# Patient Record
Sex: MALE | Race: WHITE | NOT HISPANIC OR LATINO | Employment: FULL TIME | URBAN - METROPOLITAN AREA
[De-identification: names, ages, dates, MRNs, and addresses within clinical notes are randomized per-mention and may not be internally consistent; named-entity substitution may affect disease eponyms.]

---

## 2017-01-05 ENCOUNTER — OFFICE VISIT (OUTPATIENT)
Dept: MEDICAL GROUP | Facility: CLINIC | Age: 57
End: 2017-01-05
Payer: COMMERCIAL

## 2017-01-05 VITALS
HEART RATE: 106 BPM | HEIGHT: 72 IN | TEMPERATURE: 97.5 F | SYSTOLIC BLOOD PRESSURE: 170 MMHG | WEIGHT: 220 LBS | BODY MASS INDEX: 29.8 KG/M2 | DIASTOLIC BLOOD PRESSURE: 110 MMHG | RESPIRATION RATE: 20 BRPM | OXYGEN SATURATION: 95 %

## 2017-01-05 DIAGNOSIS — I10 ESSENTIAL HYPERTENSION: ICD-10-CM

## 2017-01-05 DIAGNOSIS — Z12.11 SCREENING FOR MALIGNANT NEOPLASM OF COLON: ICD-10-CM

## 2017-01-05 DIAGNOSIS — J45.40 MODERATE PERSISTENT ASTHMA WITHOUT COMPLICATION: ICD-10-CM

## 2017-01-05 PROCEDURE — 99214 OFFICE O/P EST MOD 30 MIN: CPT | Performed by: PHYSICIAN ASSISTANT

## 2017-01-05 RX ORDER — ALBUTEROL SULFATE 90 UG/1
2 AEROSOL, METERED RESPIRATORY (INHALATION) EVERY 6 HOURS PRN
Qty: 8.5 G | Refills: 3 | Status: SHIPPED | OUTPATIENT
Start: 2017-01-05 | End: 2018-04-11 | Stop reason: SDUPTHER

## 2017-01-05 RX ORDER — FLUTICASONE PROPIONATE 220 UG/1
1 AEROSOL, METERED RESPIRATORY (INHALATION) 2 TIMES DAILY
Qty: 1 INHALER | Refills: 3 | Status: SHIPPED | OUTPATIENT
Start: 2017-01-05 | End: 2018-04-11 | Stop reason: SDUPTHER

## 2017-01-05 RX ORDER — LISINOPRIL 20 MG/1
20 TABLET ORAL DAILY
Qty: 30 TAB | Refills: 11 | Status: SHIPPED | OUTPATIENT
Start: 2017-01-05 | End: 2018-04-11 | Stop reason: SDUPTHER

## 2017-01-05 ASSESSMENT — PATIENT HEALTH QUESTIONNAIRE - PHQ9: CLINICAL INTERPRETATION OF PHQ2 SCORE: 0

## 2017-01-05 NOTE — MR AVS SNAPSHOT
"        Geoffrey Murrieta   2017 7:45 AM   Office Visit   MRN: [id number]    Department:  [facility]   Dept Phone:  [phone]    Description:  Male : 1960   Provider:  Padmini Corrigan PA-C           Reason for Visit     Wheezing patient states he is her for wheezing patient duenas asthma    Hypertension patient is concerned with Bp      Allergies as of 2017     No Known Allergies      You were diagnosed with     Essential hypertension   [8326888]       Moderate persistent asthma without complication   [197534]       Screening for malignant neoplasm of colon   [2173190]         Vital Signs     Blood Pressure Pulse Temperature Respirations Height Weight    170/110 mmHg 106 36.4 °C (97.5 °F) 20 1.829 m (6' 0.01\") 99.791 kg (220 lb)    Body Mass Index Oxygen Saturation Smoking Status             29.83 kg/m2 95% Former Smoker         Basic Information     Date Of Birth Sex Race Ethnicity Preferred Language    1960 Male White Non- English      Problem List              ICD-10-CM Priority Class Noted - Resolved    Essential hypertension I10   2017 - Present    Moderate persistent asthma J45.40   2017 - Present      Health Maintenance        Date Due Completion Dates    IMM DTaP/Tdap/Td Vaccine (1 - Tdap) 1979 ---    COLONOSCOPY 2010 ---    IMM INFLUENZA (1) 2016 ---            Current Immunizations     No immunizations on file.      Below and/or attached are the medications your provider expects you to take. Review all of your home medications and newly ordered medications with your provider and/or pharmacist. Follow medication instructions as directed by your provider and/or pharmacist. Please keep your medication list with you and share with your provider. Update the information when medications are discontinued, doses are changed, or new medications (including over-the-counter products) are added; and carry medication information at all times in the event of " emergency situations     Allergies:  No Known Allergies          Medications  Valid as of: January 05, 2017 -  8:40 AM    Generic Name Brand Name Tablet Size Instructions for use    Albuterol Sulfate (Aero Soln) albuterol 108 (90 BASE) MCG/ACT Inhale 2 Puffs by mouth every 6 hours as needed for Shortness of Breath.        Lisinopril (Tab) PRINIVIL 20 MG Take 1 Tab by mouth every day.        Omeprazole Magnesium (Tablet Delayed Response) PRILOSEC OTC 20 MG Take 20 mg by mouth every day.        .                 Medicines prescribed today were sent to:     [id number]  [place], [place] - [address] AT [facility] [address]    [address] [place] [address]    Phone: [phone] Fax: [phone]    Open 24 Hours?: No      Medication refill instructions:       If your prescription bottle indicates you have medication refills left, it is not necessary to call your provider’s office. Please contact your pharmacy and they will refill your medication.    If your prescription bottle indicates you do not have any refills left, you may request refills at any time through one of the following ways: The online Glipho system (except Urgent Care), by calling your provider’s office, or by asking your pharmacy to contact your provider’s office with a refill request. Medication refills are processed only during regular business hours and may not be available until the next business day. Your provider may request additional information or to have a follow-up visit with you prior to refilling your medication.   *Please Note: Medication refills are assigned a new Rx number when refilled electronically. Your pharmacy may indicate that no refills were authorized even though a new prescription for the same medication is available at the pharmacy. Please request the medicine by name with the pharmacy before contacting your provider for a refill.        Referral     A referral request has been sent to our patient  care coordination department. Please allow 3-5 business days for us to process this request and contact you either by phone or mail. If you do not hear from us by the 5th business day, please call us at [phone].           vSocial Access Code: Activation code not generated  Current vSocial Status: Active

## 2017-01-05 NOTE — PROGRESS NOTES
"Chief Complaint   Patient presents with   • Wheezing     patient states he is her for wheezing patient duenas asthma   • Hypertension     patient is concerned with Bp       HPI  Geoffrey Murrieta is a 56 y.o. male here today for high blood perssure. This is a new problem. pt has never been on BP meds. He was not previously diagnosed with hypertension. Checks BP at stores and office, averages 147/104, 164/102, 138/100. States BP was lower when he weighted less around 195 lb. His plan was to loose weight which hasn't happened yet and he is concerned until he loses weight he needs to control his blood pressure with medicine.  He denies HA, blurred vision, dizziness, shortness of breath, palpitations, or chest pain, lower extremity edema. Patient's wife is a nurse and can check his blood pressure.     Moderate persistent asthma  Patient has known asthma for many years. However he states recently ashtma has been worse since he has put on some extra wt. Currently wakes up every night with wheezing and asthma symptoms and uses albuterol every night, no issues during the day. Exercise also exacerbates asthma. Has SOB especially at night. No chest pain, no productive cough. No fevers or chills.          Past medical, surgical, family, and social history is reviewed in Epic chart by me today.   Medications and allergies reviewed and updated in Epic chart by me today.     ROS:   As documented in history of present illness above    Exam:  Blood pressure 170/110, pulse 106, temperature 36.4 °C (97.5 °F), resp. rate 20, height 1.829 m (6' 0.01\"), weight 99.791 kg (220 lb), SpO2 95 %.  Constitutional: Alert, no distress, plus 3 vital signs  Skin:  Warm, dry, no rashes invisible areas  Eye: Equal, round and reactive, conjunctiva clear  ENMT: Lips without lesions, good dentition, oropharynx clear    Neck: Trachea midline, no masses, no thyromegaly  Respiratory: Unlabored respiratory effort, lungs clear to auscultation, no wheezes, no " rhonchi  Cardiovascular: RRR, no murmur, no lower extremities edema, pedal pulses equal bilaterally and 2+/4   Abdomen: Soft, nontender, no masses or hepatosplenomegaly  Psych: Alert, pleasant, well-groomed, normal affect    A/P:  1. Essential hypertension  Start lisinopril  - lisinopril (PRINIVIL) 20 MG Tab; Take 1 Tab by mouth every day.  Dispense: 30 Tab; Refill: 11    2. Moderate persistent asthma without complication  Asthma is not controlled w albuterol. We'll add maintenance therapy  - fluticasone (FLOVENT HFA) 220 MCG/ACT Aerosol; Inhale 1 Puff by mouth 2 times a day.  Dispense: 1 Inhaler; Refill: 3  - albuterol 108 (90 BASE) MCG/ACT Aero Soln inhalation aerosol; Inhale 2 Puffs by mouth every 6 hours as needed for Shortness of Breath.  Dispense: 8.5 g; Refill: 3    3. Screening for malignant neoplasm of colon  - REFERRAL TO GI FOR COLONOSCOPY    Follow up in 8 weeks for asthma and hypertension

## 2017-01-05 NOTE — ASSESSMENT & PLAN NOTE
ashtma has been worse since he has put on wt, currently uses albuterol every night, no issue during the day.

## 2017-04-13 ENCOUNTER — TELEPHONE (OUTPATIENT)
Dept: MEDICAL GROUP | Facility: CLINIC | Age: 57
End: 2017-04-13

## 2017-04-13 DIAGNOSIS — I10 ESSENTIAL HYPERTENSION: ICD-10-CM

## 2017-04-13 RX ORDER — AMLODIPINE BESYLATE 5 MG/1
5 TABLET ORAL DAILY
Qty: 30 TAB | Refills: 11 | Status: SHIPPED | OUTPATIENT
Start: 2017-04-13 | End: 2017-06-23

## 2017-04-13 NOTE — TELEPHONE ENCOUNTER
Pt called today to state that he is having finger swelling and pain. He states that he believes it is due to his blood pressure medication he was given at his last visit. He was prescribed lisinopril 20mg.     Please advise.   Thank you.

## 2017-04-13 NOTE — TELEPHONE ENCOUNTER
Please inform pt that amlodipine was sent to his pharmacy. Please inform patient that amlodipine was sent to his pharmacy. However he still needs a follow-up appointment to see if his blood pressure medicine is working and his blood pressure is under control. He can have the follow-up appointment w another primary provider.     Padmini Corrigan PA-C

## 2017-04-13 NOTE — TELEPHONE ENCOUNTER
Pt is stating that it is an inconvenience for him to have an appointment with Dr Marshall because he lives on the other side of [facility]. He says he would like you to just call him in a new prescription to replace this on and does not want to come in again.      Please advise.  Thank you.

## 2017-04-14 NOTE — TELEPHONE ENCOUNTER
Called and spoke with patient and let him know about the new prescription sent to his pharmacy. Pt will  medication and track his blood pressure as well as scheduling an appointment with his PCP to follow up regarding the blood pressure problem. Pt is in full understanding.

## 2017-06-23 ENCOUNTER — HOSPITAL ENCOUNTER (OUTPATIENT)
Dept: LAB | Facility: MEDICAL CENTER | Age: 57
End: 2017-06-23
Attending: INTERNAL MEDICINE
Payer: COMMERCIAL

## 2017-06-23 ENCOUNTER — OFFICE VISIT (OUTPATIENT)
Dept: MEDICAL GROUP | Facility: PHYSICIAN GROUP | Age: 57
End: 2017-06-23
Payer: COMMERCIAL

## 2017-06-23 VITALS
DIASTOLIC BLOOD PRESSURE: 90 MMHG | WEIGHT: 215 LBS | BODY MASS INDEX: 29.12 KG/M2 | OXYGEN SATURATION: 95 % | SYSTOLIC BLOOD PRESSURE: 130 MMHG | TEMPERATURE: 98.1 F | HEIGHT: 72 IN | HEART RATE: 82 BPM | RESPIRATION RATE: 18 BRPM

## 2017-06-23 DIAGNOSIS — R63.8 INCREASED BMI: ICD-10-CM

## 2017-06-23 DIAGNOSIS — J45.40 MODERATE PERSISTENT ASTHMA WITHOUT COMPLICATION: ICD-10-CM

## 2017-06-23 DIAGNOSIS — Z78.9 ALCOHOL USE: ICD-10-CM

## 2017-06-23 DIAGNOSIS — M25.50 ARTHRALGIA, UNSPECIFIED JOINT: ICD-10-CM

## 2017-06-23 DIAGNOSIS — K21.9 GASTROESOPHAGEAL REFLUX DISEASE WITHOUT ESOPHAGITIS: ICD-10-CM

## 2017-06-23 DIAGNOSIS — E78.5 DYSLIPIDEMIA: ICD-10-CM

## 2017-06-23 LAB
CHOLEST SERPL-MCNC: 251 MG/DL (ref 100–199)
ERYTHROCYTE [SEDIMENTATION RATE] IN BLOOD BY WESTERGREN METHOD: 33 MM/HOUR (ref 0–20)
HDLC SERPL-MCNC: 42 MG/DL
LDLC SERPL CALC-MCNC: 187 MG/DL
TRIGL SERPL-MCNC: 108 MG/DL (ref 0–149)
URATE SERPL-MCNC: 8.7 MG/DL (ref 2.5–8.3)

## 2017-06-23 PROCEDURE — 80061 LIPID PANEL: CPT

## 2017-06-23 PROCEDURE — 85652 RBC SED RATE AUTOMATED: CPT

## 2017-06-23 PROCEDURE — 99214 OFFICE O/P EST MOD 30 MIN: CPT | Performed by: INTERNAL MEDICINE

## 2017-06-23 PROCEDURE — 36415 COLL VENOUS BLD VENIPUNCTURE: CPT

## 2017-06-23 PROCEDURE — 86038 ANTINUCLEAR ANTIBODIES: CPT

## 2017-06-23 PROCEDURE — 83695 ASSAY OF LIPOPROTEIN(A): CPT

## 2017-06-23 PROCEDURE — 84550 ASSAY OF BLOOD/URIC ACID: CPT

## 2017-06-23 PROCEDURE — 86200 CCP ANTIBODY: CPT

## 2017-06-23 RX ORDER — COLCHICINE 0.6 MG/1
TABLET ORAL
Qty: 3 TAB | Refills: 6 | Status: SHIPPED | OUTPATIENT
Start: 2017-06-23 | End: 2017-08-24 | Stop reason: SDUPTHER

## 2017-06-23 NOTE — MR AVS SNAPSHOT
"        Geoffrey Murrieta   2017 9:00 AM   Office Visit   MRN: [id number]    Department:  [facility]   Dept Phone:  [phone]    Description:  Male : 1960   Provider:  Matthew Marshall M.D.           Reason for Visit     Gout poss gout pain in joint,and welling      Allergies as of 2017     No Known Allergies      You were diagnosed with     Arthralgia, unspecified joint   [6866951]       Dyslipidemia   [836639]         Vital Signs     Blood Pressure Pulse Temperature Respirations Height Weight    130/90 mmHg 82 36.7 °C (98.1 °F) 18 1.829 m (6' 0.01\") 97.523 kg (215 lb)    Body Mass Index Oxygen Saturation Smoking Status             29.15 kg/m2 95% Former Smoker         Basic Information     Date Of Birth Sex Race Ethnicity Preferred Language    1960 Male White Non- English      Your appointments     2017  1:00 PM   Established Patient with Matthew Marshall M.D.   [facility] (--)    [address] [facility] [address]  [address]  [facility] [id number] [phone]           You will be receiving a confirmation call a few days before your appointment from our automated call confirmation system.              Problem List              ICD-10-CM Priority Class Noted - Resolved    Essential hypertension I10   2017 - Present    Moderate persistent asthma J45.40   2017 - Present      Health Maintenance        Date Due Completion Dates    IMM DTaP/Tdap/Td Vaccine (1 - Tdap) 2018 (Originally 1979) ---    IMM PNEUMOCOCCAL 19-64 (ADULT) MEDIUM RISK SERIES (1 of 1 - PPSV23) 2018 (Originally 1979) ---    COLONOSCOPY 2020            Current Immunizations     No immunizations on file.      Below and/or attached are the medications your provider expects you to take. Review all of your home medications and newly ordered medications with your provider and/or pharmacist. Follow medication instructions as directed by your provider and/or pharmacist. Please " keep your medication list with you and share with your provider. Update the information when medications are discontinued, doses are changed, or new medications (including over-the-counter products) are added; and carry medication information at all times in the event of emergency situations     Allergies:  No Known Allergies          Medications  Valid as of: June 23, 2017 -  9:33 AM    Generic Name Brand Name Tablet Size Instructions for use    Albuterol Sulfate (Aero Soln) albuterol 108 (90 BASE) MCG/ACT Inhale 2 Puffs by mouth every 6 hours as needed for Shortness of Breath.        Colchicine (Tab) COLCRYS 0.6 MG Take one tab every hour x 3        Fluticasone Propionate HFA (Aerosol) FLOVENT  MCG/ACT Inhale 1 Puff by mouth 2 times a day.        Lisinopril (Tab) PRINIVIL 20 MG Take 1 Tab by mouth every day.        Omeprazole Magnesium (Tablet Delayed Response) PRILOSEC OTC 20 MG Take 20 mg by mouth every day.        .                 Medicines prescribed today were sent to:     [facility] [address] [place] [address] [address] AT [facility] "[employer] [place] [address] [address] [place] [address]    Phone: [phone] Fax: [phone]    Open 24 Hours?: No      Medication refill instructions:       If your prescription bottle indicates you have medication refills left, it is not necessary to call your provider’s office. Please contact your pharmacy and they will refill your medication.    If your prescription bottle indicates you do not have any refills left, you may request refills at any time through one of the following ways: The online GetPromotd system (except Urgent Care), by calling your provider’s office, or by asking your pharmacy to contact your provider’s office with a refill request. Medication refills are processed only during regular business hours and may not be available until the next business day. Your provider may request additional information or to have a follow-up visit with you  prior to refilling your medication.   *Please Note: Medication refills are assigned a new Rx number when refilled electronically. Your pharmacy may indicate that no refills were authorized even though a new prescription for the same medication is available at the pharmacy. Please request the medicine by name with the pharmacy before contacting your provider for a refill.        Your To Do List     Future Labs/Procedures Complete By Expires    ANTI-NUCLEAR ANTIBODY SERUM  As directed 6/24/2018    CCP ANTIBODY  As directed 6/24/2018    LIPOPROTEIN A  As directed 6/23/2018    WESTERGREN SED RATE  As directed 6/24/2018         MyChart Access Code: Activation code not generated  Current MyChart Status: Active          Quit Tobacco Information     Do you want to quit using tobacco?    Quitting tobacco decreases risks of cancer, heart and lung disease, increases life expectancy, improves sense of taste and smell, and increases spending money, among other benefits.    If you are thinking about quitting, we can help.  • [facility] Quit Tobacco Program: 552-395-2309  o Program occurs weekly for four weeks and includes pharmacist consultation on products to support quitting smoking or chewing tobacco. A provider referral is needed for pharmacist consultation.  • Tobacco Users Help Hotline: 8-483-QUIT-NOW (746-2427) or https://[place].quitlogix.org/  o Free, confidential telephone and online coaching for [place] residents. Sessions are designed on a schedule that is convenient for you. Eligible clients receive free nicotine replacement therapy.  • Nationally: www.smokefree.gov  o Information and professional assistance to support both immediate and long-term needs as you become, and remain, a non-smoker. Smokefree.gov allows you to choose the help that best fits your needs.

## 2017-06-23 NOTE — PROGRESS NOTES
"Subjective:  Geoffrey is a 56 y.o. male with the following   Past Medical History   Diagnosis Date   • Hyperlipidemia    • GERD (gastroesophageal reflux disease)    • Allergy-induced asthma       Family History   Problem Relation Age of Onset   • Lung Disease Father      The patient is on the following medications:   Current outpatient prescriptions:   •  colchicine (COLCRYS) 0.6 MG Tab, Take one tab every hour x 3, Disp: 3 Tab, Rfl: 6  •  lisinopril (PRINIVIL) 20 MG Tab, Take 1 Tab by mouth every day., Disp: 30 Tab, Rfl: 11  •  fluticasone (FLOVENT HFA) 220 MCG/ACT Aerosol, Inhale 1 Puff by mouth 2 times a day., Disp: 1 Inhaler, Rfl: 3  •  albuterol 108 (90 BASE) MCG/ACT Aero Soln inhalation aerosol, Inhale 2 Puffs by mouth every 6 hours as needed for Shortness of Breath., Disp: 8.5 g, Rfl: 3  •  omeprazole (PRILOSEC OTC) 20 MG tablet, Take 20 mg by mouth every day., Disp: , Rfl:     HPI; patient is here today concerned about a recurrent localized joint swelling and pain usually after drinking alcohol alone or in combination with steak or seafood, he thinks that he has a gout, his brother has advanced gout with tophi . Patient is also concerned about the left elbow soft tissue swelling, doesn't recall injuries or fall, he continues with call consumption and states that he is never going to stop drinking. Per patient he has not taken amlodipine and lisinopril for more than a week as a potential cause of gout, his blood pressure not worsen, he has continued Flovent and albuterol for asthma denies having wheezing or shortness of breath, omeprazole for GERD denies having dysphagia..  ROS:  See HPI    Blood pressure 130/90, pulse 82, temperature 36.7 °C (98.1 °F), resp. rate 18, height 1.829 m (6' 0.01\"), weight 97.523 kg (215 lb), SpO2 95 %.on RA  Objective:  Patient is well appearing and in no acute distress.  Pharynx is clear.  Neck is soft and supple with no cervical or supraclavicular lymphadenopathy, thyromegaly or " masses, no JVD.  Lungs clear to auscultation bilaterally with normal respiratory effort. Abdomen soft, non-tender on palpation,not distended. Heart regular rate and rhythm without murmur. Extremities without any clubbing, cyanosis, or edema. Left elbow soft tissue swelling, nontender, no erythema.    Assessment and Plan:  1. Recurrent joint swelling and pain ; including ankles, fingers and toes. According to the patient usually worsen after drinking alcohol or in combination with high purine food.     2. Alcoholism; he doesn't think that he will ever quit drinking.      3. History of hypertension; per  patient he has stopped taking lisinopril and amlodipine a week ago, /90.       4. Increased BMI      5. Asthma; stable on Flovent and albuterol as needed.      6. GERD; it has responded to omeprazole 20 mg as needed.      Patient is advised on preventive and supportive care regarding arthralgia and gout and potential underlying etiologies, also discussed alcoholism, consequence of chronic excessive drinking, diet and lifestyle modification and weight loss. Colchicine 0.6 mg ×3, check labs and return for reevaluation in 2 weeks.     Please note that this dictation was created using voice recognition software. I have worked with consultants from the vendor as well as technical experts from [facility] [employer] to optimize the interface. I have made every reasonable attempt to correct obvious errors, but I expect that there are errors of grammar and possibly content that I did not discover before finalizing the note.

## 2017-06-25 LAB — LPA SERPL-MCNC: 5 MG/DL

## 2017-06-26 LAB
CCP IGG SERPL-ACNC: 7 UNITS (ref 0–19)
NUCLEAR IGG SER QL IA: DETECTED
NUCLEAR IGG TITR SER IF: ABNORMAL {TITER}

## 2017-07-12 ENCOUNTER — OFFICE VISIT (OUTPATIENT)
Dept: MEDICAL GROUP | Facility: PHYSICIAN GROUP | Age: 57
End: 2017-07-12
Payer: COMMERCIAL

## 2017-07-12 VITALS
OXYGEN SATURATION: 96 % | HEART RATE: 102 BPM | TEMPERATURE: 98.6 F | RESPIRATION RATE: 16 BRPM | WEIGHT: 226 LBS | DIASTOLIC BLOOD PRESSURE: 84 MMHG | HEIGHT: 72 IN | SYSTOLIC BLOOD PRESSURE: 142 MMHG | BODY MASS INDEX: 30.61 KG/M2

## 2017-07-12 DIAGNOSIS — K21.9 GASTROESOPHAGEAL REFLUX DISEASE WITHOUT ESOPHAGITIS: ICD-10-CM

## 2017-07-12 DIAGNOSIS — M1A.09X0 CHRONIC GOUT OF MULTIPLE SITES, UNSPECIFIED CAUSE: ICD-10-CM

## 2017-07-12 DIAGNOSIS — Z78.9 ALCOHOL USE: ICD-10-CM

## 2017-07-12 DIAGNOSIS — I10 ESSENTIAL HYPERTENSION: ICD-10-CM

## 2017-07-12 DIAGNOSIS — R63.8 INCREASED BMI: ICD-10-CM

## 2017-07-12 DIAGNOSIS — J45.40 MODERATE PERSISTENT ASTHMA WITHOUT COMPLICATION: ICD-10-CM

## 2017-07-12 PROCEDURE — 99214 OFFICE O/P EST MOD 30 MIN: CPT | Performed by: INTERNAL MEDICINE

## 2017-07-12 RX ORDER — ALLOPURINOL 100 MG/1
100 TABLET ORAL DAILY
Qty: 90 TAB | Refills: 3 | Status: SHIPPED | OUTPATIENT
Start: 2017-07-12 | End: 2018-04-11 | Stop reason: SDUPTHER

## 2017-07-12 NOTE — MR AVS SNAPSHOT
"        Geoffrey Murrieta   2017 1:00 PM   Office Visit   MRN: [id number]    Department:  [facility]   Dept Phone:  [phone]    Description:  Male : 1960   Provider:  Matthew Marshall M.D.           Reason for Visit     Gout     Results           Allergies as of 2017     No Known Allergies      You were diagnosed with     Chronic gout of multiple sites, unspecified cause   [0065454]       Essential hypertension   [5259704]         Vital Signs     Blood Pressure Pulse Temperature Respirations Height Weight    142/84 mmHg 102 37 °C (98.6 °F) 16 1.829 m (6' 0.01\") 102.513 kg (226 lb)    Body Mass Index Oxygen Saturation Smoking Status             30.64 kg/m2 96% Former Smoker         Basic Information     Date Of Birth Sex Race Ethnicity Preferred Language    1960 Male White Non- English      Your appointments     Nov 15, 2017  3:00 PM   Established Patient with Matthew Marshall M.D.   [facility] (--)    [address]  [address]  [facility] [id number] [phone]           You will be receiving a confirmation call a few days before your appointment from our automated call confirmation system.              Problem List              ICD-10-CM Priority Class Noted - Resolved    Essential hypertension I10   2017 - Present    Moderate persistent asthma J45.40   2017 - Present    Alcohol use Z78.9   2017 - Present    Increased BMI R63.8   2017 - Present    Gastroesophageal reflux disease without esophagitis K21.9   2017 - Present      Health Maintenance        Date Due Completion Dates    IMM INFLUENZA (1) 2018 (Originally 2017) ---    IMM DTaP/Tdap/Td Vaccine (1 - Tdap) 2018 (Originally 1979) ---    IMM PNEUMOCOCCAL 19-64 (ADULT) MEDIUM RISK SERIES (1 of 1 - PPSV23) 2018 (Originally 1979) ---    COLONOSCOPY 2020            Current Immunizations     No immunizations on file.      Below and/or attached are the " medications your provider expects you to take. Review all of your home medications and newly ordered medications with your provider and/or pharmacist. Follow medication instructions as directed by your provider and/or pharmacist. Please keep your medication list with you and share with your provider. Update the information when medications are discontinued, doses are changed, or new medications (including over-the-counter products) are added; and carry medication information at all times in the event of emergency situations     Allergies:  No Known Allergies          Medications  Valid as of: July 12, 2017 -  1:30 PM    Generic Name Brand Name Tablet Size Instructions for use    Albuterol Sulfate (Aero Soln) albuterol 108 (90 BASE) MCG/ACT Inhale 2 Puffs by mouth every 6 hours as needed for Shortness of Breath.        Allopurinol (Tab) ZYLOPRIM 100 MG Take 1 Tab by mouth every day.        Colchicine (Tab) COLCRYS 0.6 MG Take one tab every hour x 3        Fluticasone Propionate HFA (Aerosol) FLOVENT  MCG/ACT Inhale 1 Puff by mouth 2 times a day.        Lisinopril (Tab) PRINIVIL 20 MG Take 1 Tab by mouth every day.        Omeprazole Magnesium (Tablet Delayed Response) PRILOSEC OTC 20 MG Take 20 mg by mouth every day.        .                 Medicines prescribed today were sent to:     [facility] [address] [place] [address] [address] [facility] [address] [address] [place] [address]    Phone: [phone] Fax: [phone]    Open 24 Hours?: No      Medication refill instructions:       If your prescription bottle indicates you have medication refills left, it is not necessary to call your provider’s office. Please contact your pharmacy and they will refill your medication.    If your prescription bottle indicates you do not have any refills left, you may request refills at any time through one of the following ways: The online [employer] system (except Urgent Care), by calling your  provider’s office, or by asking your pharmacy to contact your provider’s office with a refill request. Medication refills are processed only during regular business hours and may not be available until the next business day. Your provider may request additional information or to have a follow-up visit with you prior to refilling your medication.   *Please Note: Medication refills are assigned a new Rx number when refilled electronically. Your pharmacy may indicate that no refills were authorized even though a new prescription for the same medication is available at the pharmacy. Please request the medicine by name with the pharmacy before contacting your provider for a refill.        Your To Do List     Future Labs/Procedures Complete By Expires    CREATININE  As directed 7/12/2018    WESTERGREN SED RATE  As directed 7/13/2018         MyChart Access Code: Activation code not generated  Current [employer] Status: Active          Quit Tobacco Information     Do you want to quit using tobacco?    Quitting tobacco decreases risks of cancer, heart and lung disease, increases life expectancy, improves sense of taste and smell, and increases spending money, among other benefits.    If you are thinking about quitting, we can help.  • [employer] Quit Tobacco Program: 732.130.6779  o Program occurs weekly for four weeks and includes pharmacist consultation on products to support quitting smoking or chewing tobacco. A provider referral is needed for pharmacist consultation.  • Tobacco Users Help Hotline: 8-218-QUIT-NOW (262-1256) or https://[place].quitlogix.org/  o Free, confidential telephone and online coaching for [place] residents. Sessions are designed on a schedule that is convenient for you. Eligible clients receive free nicotine replacement therapy.  • Nationally: www.smokefree.gov  o Information and professional assistance to support both immediate and long-term needs as you become, and remain, a non-smoker. Smokefree.gov  allows you to choose the help that best fits your needs.

## 2017-07-12 NOTE — PROGRESS NOTES
"Subjective:  Geoffrey is a 56 y.o. male with the following   Past Medical History   Diagnosis Date   • Hyperlipidemia    • GERD (gastroesophageal reflux disease)    • Allergy-induced asthma       Family History   Problem Relation Age of Onset   • Lung Disease Father      The patient is on the following medications:   Current outpatient prescriptions:   •  colchicine (COLCRYS) 0.6 MG Tab, Take one tab every hour x 3, Disp: 3 Tab, Rfl: 6  •  lisinopril (PRINIVIL) 20 MG Tab, Take 1 Tab by mouth every day., Disp: 30 Tab, Rfl: 11  •  fluticasone (FLOVENT HFA) 220 MCG/ACT Aerosol, Inhale 1 Puff by mouth 2 times a day., Disp: 1 Inhaler, Rfl: 3  •  albuterol 108 (90 BASE) MCG/ACT Aero Soln inhalation aerosol, Inhale 2 Puffs by mouth every 6 hours as needed for Shortness of Breath., Disp: 8.5 g, Rfl: 3  •  omeprazole (PRILOSEC OTC) 20 MG tablet, Take 20 mg by mouth every day., Disp: , Rfl:     HPI; Patient is here today for follow-up visit regarding his recent labs, per patient cultures and has made a significant improvement in his joint pain and swelling, also has cut back on drinking, requesting prophylactic therapy with allopurinol. Currently on omeprazole for GERD denies having dysphagia, on Flovent and albuterol for asthma denies having wheezing or shortness of breath..   ROS:  See HPI    Blood pressure 142/84, pulse 102, temperature 37 °C (98.6 °F), resp. rate 16, height 1.829 m (6' 0.01\"), weight 102.513 kg (226 lb), SpO2 96 %.on RA  Objective:  Patient is well appearing and in no acute distress.  Pharynx is clear.  Neck is soft and supple with no cervical or supraclavicular lymphadenopathy, thyromegaly or masses, no JVD.  Lungs clear to auscultation bilaterally with normal respiratory effort. Abdomen soft, non-tender on palpation,not distended. Heart regular rate and rhythm without murmur. Extremities without any clubbing, cyanosis, or edema.    Assessment and Plan:  1. Recurrent joint swelling and pain ; including " ankles, fingers and toes. According to the patient usually worsen after drinking alcohol or in combination with high purine food.Patient had immediate response to colchicine therapy, requesting prophylactic therapy with allopurinol.     Ref. Range 6/23/2017 09:42   Uric Acid Latest Ref Range: 2.5-8.3 mg/dL 8.7 (H)      Ref. Range 6/23/2017 09:42   Sed Rate Westergren Latest Ref Range: 0-20 mm/hour 33 (H)      Ref. Range 6/23/2017 09:42   Ccp Antibodies Latest Ref Range: 0-19 Units 7   Antinuclear Antibody Latest Ref Range: None Detected  Detected (A)   TORIBIO Titer Latest Ref Range: <1:40  1:40 (H)     2. Alcoholism; per patient he has cut back on drinking but did not quit drinking.      3. Borderline hypertension; per  patient he has stopped taking lisinopril and amlodipine.       4. Increased BMI      5. Asthma; stable on Flovent and albuterol as needed.      6. GERD; it has responded to omeprazole 20 mg as needed.    7. Dyslipidemia   Ref. Range 8/11/2011 14:16 9/18/2015 09:26 6/23/2017 09:42   Cholesterol,Tot Latest Ref Range: 100-199 mg/dL 325 (H) 252 (H) 251 (H)   Triglycerides Latest Ref Range: 0-149 mg/dL 146 94 108   HDL Latest Ref Range: >=40 mg/dL 53 48 42   LDL Latest Ref Range: <100 mg/dL 243 185 (H) 187 (H)   Lipoprotein A Latest Ref Range: <=29 mg/dL   5       Patient is advised on preventive and supportive care, diet and lifestyle modification, daily walking ,exercise and weight loss, cutting back on alcohol consumption. Start allopurinol 100 mg daily and modify doses as needed, monitor blood pressure if it is elevated start lisinopril as instructed, monitor labs.    Please note that this dictation was created using voice recognition software. I have worked with consultants from the vendor as well as technical experts from [employer] to optimize the interface. I have made every reasonable attempt to correct obvious errors, but I expect that there are errors of grammar and possibly content that I did  not discover before finalizing the note.

## 2017-08-25 RX ORDER — COLCHICINE 0.6 MG/1
TABLET ORAL
Qty: 3 TAB | Refills: 6 | Status: SHIPPED | OUTPATIENT
Start: 2017-08-25 | End: 2020-05-26

## 2017-11-15 ENCOUNTER — APPOINTMENT (OUTPATIENT)
Dept: MEDICAL GROUP | Facility: PHYSICIAN GROUP | Age: 57
End: 2017-11-15
Payer: COMMERCIAL

## 2018-04-11 DIAGNOSIS — J45.40 MODERATE PERSISTENT ASTHMA WITHOUT COMPLICATION: ICD-10-CM

## 2018-04-11 DIAGNOSIS — I10 ESSENTIAL HYPERTENSION: ICD-10-CM

## 2018-04-11 RX ORDER — ALBUTEROL SULFATE 90 UG/1
2 AEROSOL, METERED RESPIRATORY (INHALATION) EVERY 6 HOURS PRN
Qty: 8.5 G | Refills: 3 | Status: SHIPPED | OUTPATIENT
Start: 2018-04-11 | End: 2018-04-11 | Stop reason: SDUPTHER

## 2018-04-11 RX ORDER — ALLOPURINOL 100 MG/1
100 TABLET ORAL DAILY
Qty: 90 TAB | Refills: 1 | Status: SHIPPED | OUTPATIENT
Start: 2018-04-11 | End: 2019-04-29 | Stop reason: SDUPTHER

## 2018-04-11 RX ORDER — FLUTICASONE PROPIONATE 220 UG/1
1 AEROSOL, METERED RESPIRATORY (INHALATION) 2 TIMES DAILY
Qty: 1 INHALER | Refills: 3 | Status: SHIPPED | OUTPATIENT
Start: 2018-04-11 | End: 2018-05-25 | Stop reason: SDUPTHER

## 2018-04-11 RX ORDER — ALBUTEROL SULFATE 90 UG/1
2 AEROSOL, METERED RESPIRATORY (INHALATION) EVERY 6 HOURS PRN
Qty: 8.5 G | Refills: 3 | Status: SHIPPED | OUTPATIENT
Start: 2018-04-11 | End: 2018-04-12

## 2018-04-11 RX ORDER — LISINOPRIL 20 MG/1
20 TABLET ORAL DAILY
Qty: 30 TAB | Refills: 3 | Status: SHIPPED | OUTPATIENT
Start: 2018-04-11 | End: 2018-05-25 | Stop reason: SDUPTHER

## 2018-04-11 NOTE — TELEPHONE ENCOUNTER
Was the patient seen in the last year in this department? Yes     Does patient have an active prescription for medications requested? Yes     Received Request Via: Patient       Patient has New to You visit scheduled with Марина Ann on 7/12/18.

## 2018-04-12 ENCOUNTER — TELEPHONE (OUTPATIENT)
Dept: MEDICAL GROUP | Facility: PHYSICIAN GROUP | Age: 58
End: 2018-04-12

## 2018-04-12 DIAGNOSIS — J45.40 MODERATE PERSISTENT ASTHMA WITHOUT COMPLICATION: ICD-10-CM

## 2018-04-12 RX ORDER — ALBUTEROL SULFATE 90 UG/1
2 AEROSOL, METERED RESPIRATORY (INHALATION) EVERY 6 HOURS PRN
Qty: 8.5 G | Refills: 3 | Status: SHIPPED | OUTPATIENT
Start: 2018-04-12 | End: 2018-05-25 | Stop reason: SDUPTHER

## 2018-04-12 NOTE — TELEPHONE ENCOUNTER
Received a pharmacy fax stating Proventil HFA is not covered.  The preferred alternative is ProAir.  Please advise.

## 2018-05-25 DIAGNOSIS — J45.40 MODERATE PERSISTENT ASTHMA WITHOUT COMPLICATION: ICD-10-CM

## 2018-05-25 DIAGNOSIS — I10 ESSENTIAL HYPERTENSION: ICD-10-CM

## 2018-05-25 RX ORDER — FLUTICASONE PROPIONATE 220 UG/1
1 AEROSOL, METERED RESPIRATORY (INHALATION) 2 TIMES DAILY
Qty: 1 INHALER | Refills: 0 | Status: SHIPPED | OUTPATIENT
Start: 2018-05-25

## 2018-05-25 RX ORDER — LISINOPRIL 20 MG/1
20 TABLET ORAL DAILY
Qty: 90 TAB | Refills: 0 | Status: SHIPPED | OUTPATIENT
Start: 2018-05-25 | End: 2018-07-12

## 2018-05-25 RX ORDER — ALBUTEROL SULFATE 90 UG/1
2 AEROSOL, METERED RESPIRATORY (INHALATION) EVERY 6 HOURS PRN
Qty: 8.5 G | Refills: 0 | Status: SHIPPED | OUTPATIENT
Start: 2018-05-25 | End: 2020-03-19 | Stop reason: SDUPTHER

## 2018-05-25 NOTE — TELEPHONE ENCOUNTER
Was the patient seen in the last year in this department? Yes     Does patient have an active prescription for medications requested? No     Received Request Via: Patient     Pt has appt to establish with Brandi in July, would like [facility] use [facility] these Rx.

## 2018-07-12 ENCOUNTER — OFFICE VISIT (OUTPATIENT)
Dept: MEDICAL GROUP | Facility: PHYSICIAN GROUP | Age: 58
End: 2018-07-12
Payer: COMMERCIAL

## 2018-07-12 VITALS
SYSTOLIC BLOOD PRESSURE: 126 MMHG | RESPIRATION RATE: 16 BRPM | HEART RATE: 100 BPM | WEIGHT: 210.4 LBS | DIASTOLIC BLOOD PRESSURE: 80 MMHG | BODY MASS INDEX: 28.5 KG/M2 | HEIGHT: 72 IN | TEMPERATURE: 98.7 F | OXYGEN SATURATION: 92 %

## 2018-07-12 DIAGNOSIS — J45.40 MODERATE PERSISTENT ASTHMA WITHOUT COMPLICATION: ICD-10-CM

## 2018-07-12 DIAGNOSIS — K21.9 GASTROESOPHAGEAL REFLUX DISEASE WITHOUT ESOPHAGITIS: ICD-10-CM

## 2018-07-12 DIAGNOSIS — Z00.00 WELLNESS EXAMINATION: ICD-10-CM

## 2018-07-12 DIAGNOSIS — I10 ESSENTIAL HYPERTENSION: ICD-10-CM

## 2018-07-12 DIAGNOSIS — E78.2 MIXED HYPERLIPIDEMIA: ICD-10-CM

## 2018-07-12 DIAGNOSIS — Z87.39 HISTORY OF GOUT: ICD-10-CM

## 2018-07-12 PROBLEM — E78.5 HYPERLIPIDEMIA: Status: ACTIVE | Noted: 2018-07-12

## 2018-07-12 PROCEDURE — 99214 OFFICE O/P EST MOD 30 MIN: CPT | Performed by: PHYSICIAN ASSISTANT

## 2018-07-12 RX ORDER — AMLODIPINE BESYLATE 10 MG/1
10 TABLET ORAL DAILY
Qty: 90 TAB | Refills: 1 | Status: SHIPPED | OUTPATIENT
Start: 2018-07-12 | End: 2019-11-22

## 2018-07-12 RX ORDER — AMLODIPINE BESYLATE 5 MG/1
5 TABLET ORAL DAILY
COMMUNITY
End: 2018-07-12

## 2018-07-12 ASSESSMENT — PATIENT HEALTH QUESTIONNAIRE - PHQ9: CLINICAL INTERPRETATION OF PHQ2 SCORE: 0

## 2018-07-12 ASSESSMENT — PAIN SCALES - GENERAL: PAINLEVEL: NO PAIN

## 2018-07-12 NOTE — ASSESSMENT & PLAN NOTE
Chronic but stable condition. Patient's blood pressure during today's appointment 126/80 mmHg. Patient tells me he is currently prescribed amlodipine 5 mg tab once daily. Since he ran out of medication one month ago so started taking an old prescription of lisinopril 20 mg tab once daily. He tells me at home readings have been 138/93 mmHg and 139/83 mmHg. States he is unable to tolerate lisinopril due to muscle aches. Patient is requesting a refill for amlodipine during today's appointment. States diet is healthy but denies having a regular exercise routine.

## 2018-07-15 PROBLEM — Z87.39 HISTORY OF GOUT: Status: ACTIVE | Noted: 2018-07-15

## 2018-07-16 NOTE — ASSESSMENT & PLAN NOTE
Chronic stable problem. Patient is currently taking allopurinol 100 mg Once daily. Compliant with medication expenses no side effects or competitions her medication. Admits since starting prophylactic medication he has not had a flare up. Admits to alcohol use. 2 twelve packs per week.

## 2018-07-16 NOTE — PROGRESS NOTES
Chief Complaint   Patient presents with   • Establish Care   • Medication Refill       HISTORY OF PRESENT ILLNESS: Geoffrey Murrieta is an established 57 y.o. male here to discuss the evaluation and management of:    Essential hypertension  Chronic but stable condition. Patient's blood pressure during today's appointment 126/80 mmHg. Patient tells me he is currently prescribed amlodipine 5 mg tab once daily. Since he ran out of medication one month ago so started taking an old prescription of lisinopril 20 mg tab once daily. He tells me at home readings have been 138/93 mmHg and 139/83 mmHg. States he is unable to tolerate lisinopril due to muscle aches. Patient is requesting a refill for amlodipine during today's appointment. States diet is healthy but denies having a regular exercise routine.      Gastroesophageal reflux disease without esophagitis  Chronic but stable condition. Currently taking omeprazole 20 mg Once daily. States he is compliant with medication and experiences no side effects or complications from medication. Denies hoarseness of voice, nausea, vomiting, difficulty swallowing, abdominal pain.    Hyperlipidemia  Chronic problem. Patient is not on a statin medication. States he manages cholesterol levels by having a healthy diet. Patient is due for lab work.    Moderate persistent asthma  Chronic but stable condition. Patient is prescribed albuterol inhaler 2 puffs every 6 hours as needed. States he is compliant with medication and experiences no side effects or competitions her medication. Does not need a refill this time. Denies hospitalization for an acute asthmatic attack.    History of gout  Chronic stable problem. Patient is currently taking allopurinol 100 mg Once daily. Compliant with medication expenses no side effects or competitions her medication. Admits since starting prophylactic medication he has not had a flare up. Admits to alcohol use. 2 twelve packs per week.      Patient Active  Problem List    Diagnosis Date Noted   • History of gout 07/15/2018   • Hyperlipidemia 2018   • Alcohol use 2017   • Increased BMI 2017   • Gastroesophageal reflux disease without esophagitis 2017   • Essential hypertension 2017   • Moderate persistent asthma 2017       Allergies:Patient has no known allergies.    Current Outpatient Prescriptions   Medication Sig Dispense Refill   • amLODIPine (NORVASC) 10 MG Tab Take 1 Tab by mouth every day. 90 Tab 1   • albuterol 108 (90 Base) MCG/ACT Aero Soln inhalation aerosol Inhale 2 Puffs by mouth every 6 hours as needed for Shortness of Breath. 8.5 g 0   • allopurinol (ZYLOPRIM) 100 MG Tab Take 1 Tab by mouth every day. 90 Tab 1   • colchicine (COLCRYS) 0.6 MG Tab Take one tab every hour x 3 3 Tab 6   • omeprazole (PRILOSEC OTC) 20 MG tablet Take 20 mg by mouth every day.     • fluticasone (FLOVENT HFA) 220 MCG/ACT Aerosol Inhale 1 Puff by mouth 2 times a day. 1 Inhaler 0     No current facility-administered medications for this visit.        Social History   Substance Use Topics   • Smoking status: Former Smoker     Types: Cigarettes   • Smokeless tobacco: Current User     Types: Chew      Comment: 3-4 packs of cigarettes over his life span. Chews every other day.   • Alcohol use 6.0 oz/week     12 Cans of beer per week      Comment: Drinks 2 twelve packs per week.        Family Status   Relation Status   • Mother Alive   • Father    • Sister Alive   • Brother Alive   • Maternal Grandmother    • Maternal Grandfather    • Paternal Grandmother    • Paternal Grandfather    • Brother Alive   • Son Alive   • Daughter Alive   • Daughter Alive     Family History   Problem Relation Age of Onset   • No Known Problems Mother    • Lung Disease Father      COPD (smoker)   • Hyperlipidemia Father    • Other Brother      Gout   • Hyperlipidemia Brother    • Hypertension Brother    • Cancer Paternal Grandmother   "  • No Known Problems Son    • No Known Problems Daughter    • No Known Problems Daughter        ROS:  Review of Systems   Constitutional: Negative for fever, chills, weight loss and malaise/fatigue.   HENT: Negative for ear pain, nosebleeds, congestion, sore throat and neck pain.    Eyes: Negative for blurred vision.   Respiratory: Negative for cough, sputum production, shortness of breath and wheezing.    Cardiovascular: Negative for chest pain, palpitations, orthopnea and leg swelling.   Gastrointestinal: Negative for heartburn, nausea, vomiting and abdominal pain.   Genitourinary: Negative for dysuria, urgency and frequency.   Musculoskeletal: Negative for myalgias, back pain and joint pain.   Skin: Negative for rash and itching.   Neurological: Negative for dizziness, tingling, tremors, sensory change, focal weakness and headaches.   Endo/Heme/Allergies: Does not bruise/bleed easily.   Psychiatric/Behavioral: Negative for depression, suicidal ideas and memory loss.  The patient is not nervous/anxious and does not have insomnia.    All other systems reviewed and are negative except as in HPI.    Exam: Blood pressure 126/80, pulse 100, temperature 37.1 °C (98.7 °F), resp. rate 16, height 1.829 m (6' 0.01\"), weight 95.4 kg (210 lb 6.4 oz), SpO2 92 %. Body mass index is 28.53 kg/m².  General: Normal appearing. No distress.  HEENT: Normocephalic. Eyes conjunctiva clear lids without ptosis, pupils equal and reactive to light accommodation, ears normal shape and contour, canals are clear bilaterally, tympanic membranes are benign, nasal mucosa benign, oropharynx is without erythema, edema or exudates.   Neck: Supple without JVD or bruit. Thyroid is not enlarged.  Pulmonary: Clear to ausculation.  Normal effort. No rales, ronchi, or wheezing.  Cardiovascular: Regular rate and rhythm without murmur. Carotid and radial pulses are intact and equal bilaterally.  Abdomen: Soft, nontender, nondistended. Normal bowel sounds. " Liver and spleen are not palpable  Neurologic: Grossly nonfocal.  Cranial nerves are normal. LE DTR's normal and symmetric.  Lymph: No cervical, supraclavicular or axillary lymph nodes are palpable  Skin: Warm and dry.  No rashes or suspicious skin lesions.  Musculoskeletal: Normal gait. No extremity cyanosis, clubbing, or edema.  Psych: Normal mood and affect. Alert and oriented x3. Judgment and insight is normal.    Medical decision-making and discussion:  1. Gastroesophageal reflux disease without esophagitis  This is a chronic and stable problem. Patient is doing well. No red flags. Continue PPI and monitor.      2. Moderate persistent asthma without complication  Chronic but stable condition. Use albuterol inhaler as prescribed. Advised patient to use inhaler 30 minutes prior to exercise. Advised patient if he develops shortness of breath that is not alleviated with medication to seek immediate emergency care.    3. Essential hypertension  During today's appointment advised patient to discontinue lisinopril 20 mg tablets daily. Patient has been prescribed amlodipine 10 mg Once daily.    Advised patient to journal daily blood pressure readings and to bring to f/u appointment for further evaluation. Discussed with patient if blood pressure readings are consistently elevated to make a sooner appointment for evaluation. Advised patient before taking blood pressure to sit for 5-10 minutes with both feet flat on the floor, arm resting on a hard surface with elbow level and  bladder should be empty when taking blood pressure.    Encouraged diet high in fruits, vegetables, and fiber. And a diet low in salt, refined carbohydrates, cholesterol, saturated fat, and trans fatty acids. Encouraged  a minimum of 30 minutes of moderate intensity aerobic exercise (eg, brisk walking) is recommended on five days each week.     Advised patient if he develops chest pain, shortness of breath, unexplained confusion, syncope, vision  changes, facial/arm drooping, jaw claudication, weakness of extremities to seek immediate emergency care.    - amLODIPine (NORVASC) 10 MG Tab; Take 1 Tab by mouth every day.  Dispense: 90 Tab; Refill: 1  - COMP METABOLIC PANEL; Future    4. Mixed hyperlipidemia  Labs as indicated. Will discuss lab work in 3 months. Continue to monitor.    - LIPID PROFILE; Future    5. Wellness examination    - COMP METABOLIC PANEL; Future  - LIPID PROFILE; Future    6. History of gout  Continue current medication. Will continue to monitor. Patient will follow-up in 3 months for evaluation. Advised patient to work on alcohol cessation. Continue working on diet and exercise. Avoid diet high in purines, red meat or fish.      Please note that this dictation was created using voice recognition software. I have made every reasonable attempt to correct obvious errors, but I expect that there are errors of grammar and possibly content that I did not discover before finalizing the note.      Return in about 3 months (around 10/12/2018).

## 2018-07-16 NOTE — ASSESSMENT & PLAN NOTE
Chronic problem. Patient is not on a statin medication. States he manages cholesterol levels by having a healthy diet. Patient is due for lab work.

## 2018-07-16 NOTE — ASSESSMENT & PLAN NOTE
Chronic but stable condition. Currently taking omeprazole 20 mg Once daily. States he is compliant with medication and experiences no side effects or complications from medication. Denies hoarseness of voice, nausea, vomiting, difficulty swallowing, abdominal pain.

## 2018-07-16 NOTE — ASSESSMENT & PLAN NOTE
Chronic but stable condition. Patient is prescribed albuterol inhaler 2 puffs every 6 hours as needed. States he is compliant with medication and experiences no side effects or competitions her medication. Does not need a refill this time. Denies hospitalization for an acute asthmatic attack.

## 2018-10-23 ENCOUNTER — HOSPITAL ENCOUNTER (OUTPATIENT)
Dept: LAB | Facility: MEDICAL CENTER | Age: 58
End: 2018-10-23
Attending: PHYSICIAN ASSISTANT
Payer: COMMERCIAL

## 2018-10-23 DIAGNOSIS — Z00.00 WELLNESS EXAMINATION: ICD-10-CM

## 2018-10-23 DIAGNOSIS — E78.2 MIXED HYPERLIPIDEMIA: ICD-10-CM

## 2018-10-23 DIAGNOSIS — I10 ESSENTIAL HYPERTENSION: ICD-10-CM

## 2018-10-23 LAB
ALBUMIN SERPL BCP-MCNC: 4.3 G/DL (ref 3.2–4.9)
ALBUMIN/GLOB SERPL: 1.2 G/DL
ALP SERPL-CCNC: 94 U/L (ref 30–99)
ALT SERPL-CCNC: 37 U/L (ref 2–50)
ANION GAP SERPL CALC-SCNC: 10 MMOL/L (ref 0–11.9)
AST SERPL-CCNC: 28 U/L (ref 12–45)
BILIRUB SERPL-MCNC: 0.4 MG/DL (ref 0.1–1.5)
BUN SERPL-MCNC: 12 MG/DL (ref 8–22)
CALCIUM SERPL-MCNC: 10.2 MG/DL (ref 8.5–10.5)
CHLORIDE SERPL-SCNC: 100 MMOL/L (ref 96–112)
CHOLEST SERPL-MCNC: 212 MG/DL (ref 100–199)
CO2 SERPL-SCNC: 26 MMOL/L (ref 20–33)
CREAT SERPL-MCNC: 0.98 MG/DL (ref 0.5–1.4)
FASTING STATUS PATIENT QL REPORTED: NORMAL
GLOBULIN SER CALC-MCNC: 3.6 G/DL (ref 1.9–3.5)
GLUCOSE SERPL-MCNC: 91 MG/DL (ref 65–99)
HDLC SERPL-MCNC: 55 MG/DL
LDLC SERPL CALC-MCNC: 141 MG/DL
POTASSIUM SERPL-SCNC: 4.2 MMOL/L (ref 3.6–5.5)
PROT SERPL-MCNC: 7.9 G/DL (ref 6–8.2)
SODIUM SERPL-SCNC: 136 MMOL/L (ref 135–145)
TRIGL SERPL-MCNC: 81 MG/DL (ref 0–149)

## 2018-10-23 PROCEDURE — 80061 LIPID PANEL: CPT

## 2018-10-23 PROCEDURE — 80053 COMPREHEN METABOLIC PANEL: CPT

## 2018-10-23 PROCEDURE — 36415 COLL VENOUS BLD VENIPUNCTURE: CPT

## 2018-10-25 ENCOUNTER — OFFICE VISIT (OUTPATIENT)
Dept: MEDICAL GROUP | Facility: PHYSICIAN GROUP | Age: 58
End: 2018-10-25
Payer: COMMERCIAL

## 2018-10-25 VITALS
WEIGHT: 204 LBS | BODY MASS INDEX: 27.63 KG/M2 | TEMPERATURE: 99 F | HEIGHT: 72 IN | DIASTOLIC BLOOD PRESSURE: 86 MMHG | HEART RATE: 82 BPM | SYSTOLIC BLOOD PRESSURE: 128 MMHG | OXYGEN SATURATION: 95 % | RESPIRATION RATE: 16 BRPM

## 2018-10-25 DIAGNOSIS — I10 ESSENTIAL HYPERTENSION: ICD-10-CM

## 2018-10-25 DIAGNOSIS — Z90.81 HISTORY OF SPLENECTOMY: ICD-10-CM

## 2018-10-25 DIAGNOSIS — E78.2 MIXED HYPERLIPIDEMIA: ICD-10-CM

## 2018-10-25 DIAGNOSIS — Z23 NEED FOR VACCINATION: ICD-10-CM

## 2018-10-25 PROCEDURE — 90471 IMMUNIZATION ADMIN: CPT | Performed by: PHYSICIAN ASSISTANT

## 2018-10-25 PROCEDURE — 90670 PCV13 VACCINE IM: CPT | Performed by: PHYSICIAN ASSISTANT

## 2018-10-25 PROCEDURE — 99214 OFFICE O/P EST MOD 30 MIN: CPT | Mod: 25 | Performed by: PHYSICIAN ASSISTANT

## 2018-10-25 RX ORDER — LISINOPRIL 10 MG/1
10 TABLET ORAL DAILY
Qty: 90 TAB | Refills: 3 | Status: SHIPPED | OUTPATIENT
Start: 2018-10-25 | End: 2019-11-16 | Stop reason: SDUPTHER

## 2018-10-25 RX ORDER — LISINOPRIL 20 MG/1
20 TABLET ORAL DAILY
Qty: 90 TAB | Refills: 3 | Status: SHIPPED | OUTPATIENT
Start: 2018-10-25 | End: 2018-10-25

## 2018-10-25 ASSESSMENT — PAIN SCALES - GENERAL: PAINLEVEL: NO PAIN

## 2018-10-25 NOTE — ASSESSMENT & PLAN NOTE
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  States he has not had pneumonia vaccinations since 1990.  Patient is inquiring about pneumonia vaccinations.

## 2018-10-25 NOTE — ASSESSMENT & PLAN NOTE
Chronic problem.  Discussed lipid profile lab work from 10/23/2018 with patient.  Results are as follows:  Cholesterol,Tot 212   100 - 199 mg/dL Final   Triglycerides 81  0 - 149 mg/dL Final   HDL 55  >=40 mg/dL Final      <100 mg/dL Final     Discussed with patient that total cholesterol and LDL has improved compared to past results. He tells me that he has been doing the keto diet with is wife for the past 4-5 months and taking over-the-counter Cholesterol off.

## 2018-10-25 NOTE — PROGRESS NOTES
Chief Complaint   Patient presents with   • Results     discuss lab results    • Flu Vaccine   • Immunizations     Pneumo - required due to spleen removal in 1989        HISTORY OF PRESENT ILLNESS: Geoffrey Murrieta is an established 58 y.o. male here to discuss the evaluation and management of:    Essential hypertension  Chronic problem.  Patient's blood pressure was taken on 2 separate occasions during today's appointment.  First reading 134/90 mmHg and second reading 128/86 of Hg.  Patient is currently taking amlodipine 10 mg tab once daily.  He tells me that he is compliant with medication and experiences no side effects or complications from medication.  During last appointment on 7/12/2018 lisinopril 20 mg tab once daily was discontinued due to patient experiencing unpleasant side effects.  He tells me that he is expressing muscle aches.  States he is unsure if symptoms were associated with medication or an acute gouty flare.  States he monitors blood pressure at home.  He tells me that at home readings average 126/84 mmHg.  States he has been doing that keto diet for the past 4-5 months and is losing weight.  States he is walking 2-3 times a week 45 minutes per time.  Admits that he is not getting his heart rate up during his walks.  States he and his wife are taking leisurely walks.  Denies chest pain, shortness of breath, heart palpitations, dizziness, syncope, severe headache, vision changes or peripheral edema.    History of splenectomy  Patient states in 1989 he had a splenectomy.  States he has not had pneumonia vaccinations since 1990.  Patient is inquiring about pneumonia vaccinations.    Hyperlipidemia  Chronic problem.  Discussed lipid profile lab work from 10/23/2018 with patient.  Results are as follows:  Cholesterol,Tot 212   100 - 199 mg/dL Final   Triglycerides 81  0 - 149 mg/dL Final   HDL 55  >=40 mg/dL Final      <100 mg/dL Final     Discussed with patient that total cholesterol and LDL  has improved compared to past results. He tells me that he has been doing the keto diet with is wife for the past 4-5 months and taking over-the-counter Cholesterol off.       Patient Active Problem List    Diagnosis Date Noted   • History of splenectomy 10/25/2018   • History of gout 07/15/2018   • Hyperlipidemia 2018   • Alcohol use 2017   • Increased BMI 2017   • Gastroesophageal reflux disease without esophagitis 2017   • Essential hypertension 2017   • Moderate persistent asthma 2017       Allergies:Patient has no known allergies.    Current Outpatient Prescriptions   Medication Sig Dispense Refill   • Zoster Vac Recomb Adjuvanted (SHINGRIX) 50 MCG Recon Susp 0.5 mL by Intramuscular route Once for 1 dose. 0.5 mL 1   • lisinopril (PRINIVIL) 10 MG Tab Take 1 Tab by mouth every day. 90 Tab 3   • amLODIPine (NORVASC) 10 MG Tab Take 1 Tab by mouth every day. 90 Tab 1   • albuterol 108 (90 Base) MCG/ACT Aero Soln inhalation aerosol Inhale 2 Puffs by mouth every 6 hours as needed for Shortness of Breath. 8.5 g 0   • fluticasone (FLOVENT HFA) 220 MCG/ACT Aerosol Inhale 1 Puff by mouth 2 times a day. 1 Inhaler 0   • allopurinol (ZYLOPRIM) 100 MG Tab Take 1 Tab by mouth every day. 90 Tab 1   • colchicine (COLCRYS) 0.6 MG Tab Take one tab every hour x 3 3 Tab 6   • omeprazole (PRILOSEC OTC) 20 MG tablet Take 20 mg by mouth every day.       No current facility-administered medications for this visit.        Social History   Substance Use Topics   • Smoking status: Never Smoker   • Smokeless tobacco: Current User     Types: Chew      Comment: Occ chews q 2 weeks.    • Alcohol use 6.0 oz/week     12 Cans of beer per week      Comment: Drinks 2 twelve packs per week.        Family Status   Relation Status   • Mo Alive   • Fa    • Sis Alive   • Bro Alive   • MGMo    • MGFa    • PGMo    • PGFa    • Bro Alive   • Son Alive   • Damaris Alive   • Damaris Alive          Family History   Problem Relation Age of Onset   • No Known Problems Mother    • Lung Disease Father         COPD (smoker)   • Hyperlipidemia Father    • Other Brother         Gout   • Hyperlipidemia Brother    • Hypertension Brother    • Cancer Paternal Grandmother    • No Known Problems Son    • No Known Problems Daughter    • No Known Problems Daughter        ROS:  Review of Systems   Constitutional: Negative for fever, chills, weight loss and malaise/fatigue.   HENT: Negative for ear pain, nosebleeds, congestion, sore throat and neck pain.    Eyes: Negative for blurred vision.   Respiratory: Negative for cough, sputum production, shortness of breath and wheezing.    Cardiovascular: Negative for chest pain, palpitations, orthopnea and leg swelling.   Gastrointestinal: Negative for heartburn, nausea, vomiting and abdominal pain.   Genitourinary: Negative for dysuria, urgency and frequency.   Musculoskeletal: Negative for myalgias, back pain and joint pain.   Skin: Negative for rash and itching.   Neurological: Negative for dizziness, tingling, tremors, sensory change, focal weakness and headaches.   Endo/Heme/Allergies: Does not bruise/bleed easily.   Psychiatric/Behavioral: Negative for depression, suicidal ideas and memory loss.  The patient is not nervous/anxious and does not have insomnia.    All other systems reviewed and are negative except as in HPI.    Exam: Blood pressure 128/86, pulse 82, temperature 37.2 °C (99 °F), temperature source Temporal, resp. rate 16, height 1.829 m (6'), weight 92.5 kg (204 lb), SpO2 95 %. Body mass index is 27.67 kg/m².  General: Normal appearing. No distress.  HEENT: Normocephalic. Eyes conjunctiva clear lids without ptosis, ears normal shape and contour.  Neck: Supple without JVD or bruit. Thyroid is not enlarged.  Pulmonary: Clear to ausculation.  Normal effort. No rales, ronchi, or wheezing.  Cardiovascular: Regular rate and rhythm without murmur.   Abdomen: Soft,  nontender, nondistended.   Neurologic: Grossly nonfocal.  Cranial nerves are normal.   Lymph: No cervical, supraclavicular or axillary lymph nodes are palpable  Skin: Warm and dry.  No rashes or suspicious skin lesions.  Musculoskeletal: Normal gait. No extremity cyanosis, clubbing, or edema.  Psych: Normal mood and affect. Alert and oriented x3. Judgment and insight is normal.    Medical decision-making and discussion:  1. Essential hypertension  Patient's blood pressure was taken on 2 separate occasions during today's appointment.  First reading 134/90 mmHg and second reading 128/86 of Hg.  Patient is currently taking amlodipine 10 mg tab once daily.  Advised patient to continue doing so.  Lisinopril 20 mg tab once daily was discontinued during last appointment on 7/12/2018 due to patient stating that he was experiencing unpleasant side effects(muscle aches) from medication.  He tells me during today's appointment he is unsure if medication was causing the unpleasant symptoms or if it was due to an acute gouty flare.  Patient would like to be placed back on lisinopril in addition to amlodipine.  During today's appointment lisinopril 10 mg tab once daily has been prescribed to patient.  Discussed common side effects and adverse reactions of medication with patient.  Advised patient if he develops dry cough to contact me.  Patient will contact me in the upcoming weeks with at home blood pressure readings.  I will make medication adjustments at that time if needed.  If patient experiences unpleasant side effects from lisinopril, I will prescribe hydrochlorothiazide 12.5 mg tab once daily at that time.    Discussed decreasing salt intake. Emphasized benefits of exercise and diet. Continue to monitor.    - lisinopril (PRINIVIL) 10 MG Tab; Take 1 Tab by mouth every day.  Dispense: 90 Tab; Refill: 3    2. Mixed hyperlipidemia   Discussed lipid profile lab work from 10/23/2018 with patient.  Results are as  follows:  Cholesterol,Tot 212   100 - 199 mg/dL Final   Triglycerides 81  0 - 149 mg/dL Final   HDL 55  >=40 mg/dL Final      <100 mg/dL Final     Results have improved when compared to past results.  Discussed cardiovascular risk with patient.  Patient declines statin medication at this time.  He tells me that he would like to continue working on diet and exercise.  States he and his wife have been doing the keto diet for the past 4-5 months and he is losing weight.  States he is also taking over-the-counter cholesterol off.   Emphasized importance of healthy diet regular exercise routine with patient.  Will continue to monitor.    3. History of splenectomy  A Prevnar vaccination was administered to patient without complication. A VIS form was provided to patient.     - Pneumococcal Conjugate Vaccine 13-Valent    4. Need for vaccination  A Prevnar vaccination was administered to patient without complication. A VIS form was provided to patient.   A Shingrix prescription was provided to patient during today's appointment.  Advised patient to contact his medical insurance to discuss cost of vaccinations.    - Pneumococcal Conjugate Vaccine 13-Valent  - Zoster Vac Recomb Adjuvanted (SHINGRIX) 50 MCG Recon Susp; 0.5 mL by Intramuscular route Once for 1 dose.  Dispense: 0.5 mL; Refill: 1    Discussed with patient that electro lites, fasting glucose, liver and kidney function are within normal limits.    Please note that this dictation was created using voice recognition software. I have made every reasonable attempt to correct obvious errors, but I expect that there are errors of grammar and possibly content that I did not discover before finalizing the note.        Return if symptoms worsen or fail to improve.

## 2018-10-25 NOTE — ASSESSMENT & PLAN NOTE
Chronic problem.  Patient's blood pressure was taken on 2 separate occasions during today's appointment.  First reading 134/90 mmHg and second reading 128/86 of Hg.  Patient is currently taking amlodipine 10 mg tab once daily.  He tells me that he is compliant with medication and experiences no side effects or complications from medication.  During last appointment on 7/12/2018 lisinopril 20 mg tab once daily was discontinued due to patient experiencing unpleasant side effects.  He tells me that he is expressing muscle aches.  States he is unsure if symptoms were associated with medication or an acute gouty flare.  States he monitors blood pressure at home.  He tells me that at home readings average 126/84 mmHg.  States he has been doing that keto diet for the past 4-5 months and is losing weight.  States he is walking 2-3 times a week 45 minutes per time.  Admits that he is not getting his heart rate up during his walks.  States he and his wife are taking leisurely walks.  Denies chest pain, shortness of breath, heart palpitations, dizziness, syncope, severe headache, vision changes or peripheral edema.

## 2019-04-29 RX ORDER — ALLOPURINOL 100 MG/1
100 TABLET ORAL DAILY
Qty: 90 TAB | Refills: 2 | Status: SHIPPED | OUTPATIENT
Start: 2019-04-29 | End: 2019-11-22 | Stop reason: SDUPTHER

## 2019-09-22 ENCOUNTER — OFFICE VISIT (OUTPATIENT)
Dept: URGENT CARE | Facility: PHYSICIAN GROUP | Age: 59
End: 2019-09-22
Payer: COMMERCIAL

## 2019-09-22 VITALS
SYSTOLIC BLOOD PRESSURE: 122 MMHG | RESPIRATION RATE: 18 BRPM | WEIGHT: 204 LBS | HEIGHT: 72 IN | HEART RATE: 70 BPM | BODY MASS INDEX: 27.63 KG/M2 | OXYGEN SATURATION: 97 % | TEMPERATURE: 98 F | DIASTOLIC BLOOD PRESSURE: 88 MMHG

## 2019-09-22 DIAGNOSIS — T14.8XXA WOUND OF SKIN: ICD-10-CM

## 2019-09-22 DIAGNOSIS — Z90.81: ICD-10-CM

## 2019-09-22 PROCEDURE — 90471 IMMUNIZATION ADMIN: CPT | Performed by: FAMILY MEDICINE

## 2019-09-22 PROCEDURE — 90715 TDAP VACCINE 7 YRS/> IM: CPT | Performed by: FAMILY MEDICINE

## 2019-09-22 PROCEDURE — 99214 OFFICE O/P EST MOD 30 MIN: CPT | Mod: 25 | Performed by: FAMILY MEDICINE

## 2019-09-22 RX ORDER — CEPHALEXIN 500 MG/1
500 CAPSULE ORAL 3 TIMES DAILY
Qty: 15 CAP | Refills: 0 | Status: SHIPPED | OUTPATIENT
Start: 2019-09-22 | End: 2019-09-27

## 2019-09-22 NOTE — PROGRESS NOTES
Subjective:      Geoffrey Murrieta is a 59 y.o. male who presents with Laceration (left knee, happened in [place]. he was trying to catch a table that was falling, he fell on it once it had broken.)      - This is a pleasant and non toxic appearing 59 y.o. male with c/o trip/fall ~2 days ago whilst in [place] for vacation. Cut Lt knee and cleaned and dressed it. Unsure of last tetanus booster. Hx asplenia.             ALLERGIES:  Patient has no known allergies.     PMH:  Past Medical History:   Diagnosis Date   • Allergy-induced asthma    • GERD (gastroesophageal reflux disease)    • Hyperlipidemia         PSH:  Past Surgical History:   Procedure Laterality Date   • SPLENECTOMY  1985    softball injury/ spleen rupture.    • KNEE ARTHROSCOPY  5/2105    left knee   • ROTATOR CUFF REPAIR      left shoulder       MEDS:    Current Outpatient Medications:   •  cephALEXin (KEFLEX) 500 MG Cap, Take 1 Cap by mouth 3 times a day for 5 days., Disp: 15 Cap, Rfl: 0  •  allopurinol (ZYLOPRIM) 100 MG Tab, Take 1 Tab by mouth every day., Disp: 90 Tab, Rfl: 2  •  lisinopril (PRINIVIL) 10 MG Tab, Take 1 Tab by mouth every day., Disp: 90 Tab, Rfl: 3  •  amLODIPine (NORVASC) 10 MG Tab, Take 1 Tab by mouth every day., Disp: 90 Tab, Rfl: 1  •  albuterol 108 (90 Base) MCG/ACT Aero Soln inhalation aerosol, Inhale 2 Puffs by mouth every 6 hours as needed for Shortness of Breath., Disp: 8.5 g, Rfl: 0  •  fluticasone (FLOVENT HFA) 220 MCG/ACT Aerosol, Inhale 1 Puff by mouth 2 times a day., Disp: 1 Inhaler, Rfl: 0  •  colchicine (COLCRYS) 0.6 MG Tab, Take one tab every hour x 3, Disp: 3 Tab, Rfl: 6  •  omeprazole (PRILOSEC OTC) 20 MG tablet, Take 20 mg by mouth every day., Disp: , Rfl:     ** I have documented what I find to be significant in regards to past medical, social, family and surgical history  in my HPI or under PMH/PSH/FH review section, otherwise it is contributory **         HPI    Review of Systems   Skin:        Cut Lt knee   All  other systems reviewed and are negative.         Objective:     /88   Pulse 70   Temp 36.7 °C (98 °F) (Temporal)   Resp 18   Ht 1.829 m (6')   Wt 92.5 kg (204 lb)   SpO2 97%   BMI 27.67 kg/m²      Physical Exam   Constitutional: He appears well-developed and well-nourished. No distress.   HENT:   Head: Normocephalic and atraumatic.   Eyes: Conjunctivae are normal.   Cardiovascular: Normal heart sounds.   No murmur heard.  Pulmonary/Chest: Effort normal. No respiratory distress.   Neurological: He is alert. He exhibits normal muscle tone.   Skin: Skin is warm and dry. He is not diaphoretic.   Psychiatric: He has a normal mood and affect. Judgment normal.   Nursing note and vitals reviewed.  ~3-4cm horizontal closed non bleeding lac across anterior knee. No signs of infection             Assessment/Plan:         1. Wound of skin  cephALEXin (KEFLEX) 500 MG Cap    Tdap =>6yo IM   2. History of asplenia         - rest/hydrate   - wound care discussed       Dx & d/c instructions discussed w/ patient and/or family members.     Follow up with PCP (or here if PCP unavailable) in 2-3 days if symptoms not improving, ER if feeling/getting worse.    Any realistic and/or common medication side effects that may have been given today(i.e. Rash, GI upset/constipation, sedation, elevation of BP or blood sugars) reviewed.     Patient left in stable condition

## 2019-11-16 DIAGNOSIS — I10 ESSENTIAL HYPERTENSION: ICD-10-CM

## 2019-11-19 RX ORDER — LISINOPRIL 10 MG/1
TABLET ORAL
Qty: 90 TAB | Refills: 0 | Status: SHIPPED | OUTPATIENT
Start: 2019-11-19 | End: 2020-02-14

## 2019-11-22 ENCOUNTER — OFFICE VISIT (OUTPATIENT)
Dept: MEDICAL GROUP | Facility: PHYSICIAN GROUP | Age: 59
End: 2019-11-22
Payer: COMMERCIAL

## 2019-11-22 VITALS
WEIGHT: 212 LBS | DIASTOLIC BLOOD PRESSURE: 82 MMHG | BODY MASS INDEX: 28.71 KG/M2 | RESPIRATION RATE: 20 BRPM | TEMPERATURE: 97.3 F | HEIGHT: 72 IN | SYSTOLIC BLOOD PRESSURE: 118 MMHG | OXYGEN SATURATION: 93 % | HEART RATE: 100 BPM

## 2019-11-22 DIAGNOSIS — J45.40 MODERATE PERSISTENT ASTHMA WITHOUT COMPLICATION: ICD-10-CM

## 2019-11-22 DIAGNOSIS — Z23 NEED FOR VACCINATION: ICD-10-CM

## 2019-11-22 DIAGNOSIS — I10 ESSENTIAL HYPERTENSION: ICD-10-CM

## 2019-11-22 DIAGNOSIS — E78.2 MIXED HYPERLIPIDEMIA: ICD-10-CM

## 2019-11-22 DIAGNOSIS — G47.33 OBSTRUCTIVE SLEEP APNEA ON CPAP: ICD-10-CM

## 2019-11-22 DIAGNOSIS — M10.9 GOUTY ARTHRITIS: ICD-10-CM

## 2019-11-22 DIAGNOSIS — K21.9 GASTROESOPHAGEAL REFLUX DISEASE WITHOUT ESOPHAGITIS: ICD-10-CM

## 2019-11-22 DIAGNOSIS — F41.1 GAD (GENERALIZED ANXIETY DISORDER): ICD-10-CM

## 2019-11-22 PROCEDURE — 99214 OFFICE O/P EST MOD 30 MIN: CPT | Mod: 25 | Performed by: PHYSICIAN ASSISTANT

## 2019-11-22 PROCEDURE — 90732 PPSV23 VACC 2 YRS+ SUBQ/IM: CPT | Performed by: PHYSICIAN ASSISTANT

## 2019-11-22 PROCEDURE — 90471 IMMUNIZATION ADMIN: CPT | Performed by: PHYSICIAN ASSISTANT

## 2019-11-22 PROCEDURE — 90472 IMMUNIZATION ADMIN EACH ADD: CPT | Performed by: PHYSICIAN ASSISTANT

## 2019-11-22 PROCEDURE — 90686 IIV4 VACC NO PRSV 0.5 ML IM: CPT | Performed by: PHYSICIAN ASSISTANT

## 2019-11-22 RX ORDER — ALLOPURINOL 100 MG/1
100 TABLET ORAL DAILY
Qty: 90 TAB | Refills: 3 | Status: SHIPPED | OUTPATIENT
Start: 2019-11-22 | End: 2020-05-26

## 2019-11-22 RX ORDER — MONTELUKAST SODIUM 10 MG/1
10 TABLET ORAL DAILY
Qty: 30 TAB | Refills: 12 | Status: SHIPPED | OUTPATIENT
Start: 2019-11-22 | End: 2020-11-25

## 2019-11-22 RX ORDER — DULOXETIN HYDROCHLORIDE 60 MG/1
60 CAPSULE, DELAYED RELEASE ORAL
Refills: 4 | COMMUNITY
Start: 2019-10-22 | End: 2019-11-22

## 2019-11-22 RX ORDER — DULOXETIN HYDROCHLORIDE 60 MG/1
60 CAPSULE, DELAYED RELEASE ORAL DAILY
Qty: 90 CAP | Refills: 3 | Status: SHIPPED | OUTPATIENT
Start: 2019-11-22 | End: 2020-05-26

## 2019-11-22 ASSESSMENT — PATIENT HEALTH QUESTIONNAIRE - PHQ9: CLINICAL INTERPRETATION OF PHQ2 SCORE: 0

## 2019-11-22 NOTE — PROGRESS NOTES
"Chief Complaint   Patient presents with   • Apnea     pt states \"acting up again\"   • Asthma     \"worse when I exersize\"        HISTORY OF PRESENT ILLNESS: Geoffrey Murrieta is an established 59 y.o. male here to discuss the evaluation and management of:     Moderate persistent asthma without complication  Chronic but worsening problem.  Patient states in the past 2 years symptoms have worsened.  States he is wheezing more frequently.  He denies nighttime coughing.  States on average she is using his albuterol inhaler 4-5 times a day.  States he will take 3 puffs by mouth each time he uses it.  Patient denies having a positive past medical history for smoking.  States symptoms are exacerbated with exercise and during the spring when things are in bloom.  Patient is inquiring about treatment options.  Is been several years since he had PFTs.  He denies recent acute flareups.     Obstructive sleep apnea on CPAP  Chronic but worsening problem.  Patient states he was diagnosed with sleep apnea 15 years ago.  States he was prescribed a CPAP machine and has been using it compliantly.  States he has not been reevaluated in over 10+ years.  He tells me that he buys his sleep apnea machines parts off of [employer].  States in the last few months he has been feeling more tired and experiencing daytime somnolence.  He denies difficulty with falling asleep or staying asleep.  He tells me that he recently increased the oxygen dosage on his CPAP machine with no improvement in symptoms.  Patient is requesting further work-up.  A referral was placed to sleep studies.    He mentions that he has lost 20+ LBS and is continue to work on diet and exercise.  States diet is healthy and he is a regular exercise routine.  Exercise routine consist of cardiovascular and strength training 3 days a week 1 hour per time.      TAMICA (generalized anxiety disorder)  Patient states that 50 years old he developed anxiety.  He tells me one year ago he was " placed on duloxetine 60 mg delayed release capsule once daily.  States since taking medication anxiety symptoms are controlled.  He tells me that he is doing well on current regimen and is requesting refill.  Denies homicidal or suicidal ideation.    Gouty arthritis  Chronic with stable problem.  States he has not had acute gouty flare in several years.  Patient is taking prophylactic allopurinol 100 mg tab once daily.  States he is compliant with medication experience is no side effects or complications medication.  Patient states he avoids known triggers.  He is requesting refill.    Essential hypertension  Chronic with stable problem.  Patient's blood pressure during today's appointment 118/82 mmHg.  Patient currently taking lisinopril 10 mg tab once daily.  States he is compliant with medication and experiences no side effects or complications or medication.  Admits to healthy diet and regular exercise routine.  Denies chest pain, shortness of breath, heart palpitations, dizziness, syncope, severe headache, vision changes, peripheral edema.  Denies dry cough.    Gastroesophageal reflux disease without esophagitis  Chronic but stable problem.  Patient states he takes over-the-counter Prilosec on an as-needed basis.  Symptoms are managed with medication.  Denies dry cough, hoarseness of voice, pain with swallowing, difficulty swallowing, nausea, vomiting, abdominal pain, bowel habit changes.      Mixed hyperlipidemia  Lab work from 1018 indicated slightly elevated total cholesterol and bad cholesterol.  Patient does not want to repeat lab work at this time.  Discussed ASCVD and CVD 10-year risk with patient.  Patient medium risk.  Patient does not want to be placed on a statin medication.  States he would like to work on diet and exercise.  He does not want to repeat lab work at this time.      Patient Active Problem List    Diagnosis Date Noted   • History of splenectomy 10/25/2018   • History of gout 07/15/2018    • Hyperlipidemia 2018   • Alcohol use 2017   • Increased BMI 2017   • Gastroesophageal reflux disease without esophagitis 2017   • Essential hypertension 2017   • Moderate persistent asthma 2017       Allergies:Patient has no known allergies.    Current Outpatient Medications   Medication Sig Dispense Refill   • DULoxetine (CYMBALTA) 60 MG Cap DR Particles delayed-release capsule Take 1 Cap by mouth every day. 90 Cap 3   • allopurinol (ZYLOPRIM) 100 MG Tab Take 1 Tab by mouth every day. 90 Tab 3   • beclomethasone HFA (QVAR REDIHALER) 80 MCG/ACT inhaler Inhale 1 Puff by mouth 2 times a day. 1 Inhaler 6   • lisinopril (PRINIVIL) 10 MG Tab TAKE 1 TABLET BY MOUTH EVERY DAY 90 Tab 0   • albuterol 108 (90 Base) MCG/ACT Aero Soln inhalation aerosol Inhale 2 Puffs by mouth every 6 hours as needed for Shortness of Breath. 8.5 g 0   • fluticasone (FLOVENT HFA) 220 MCG/ACT Aerosol Inhale 1 Puff by mouth 2 times a day. 1 Inhaler 0   • colchicine (COLCRYS) 0.6 MG Tab Take one tab every hour x 3 3 Tab 6   • omeprazole (PRILOSEC OTC) 20 MG tablet Take 20 mg by mouth every day.       No current facility-administered medications for this visit.        Social History     Tobacco Use   • Smoking status: Never Smoker   • Smokeless tobacco: Current User     Types: Chew   Substance Use Topics   • Alcohol use: Yes     Alcohol/week: 6.0 oz     Types: 12 Cans of beer per week     Comment: Drinks 2-3 times a week and 3-4 beers per time.   • Drug use: No       Family Status   Relation Name Status   • Mo  Alive   • Fa     • Sis  Alive   • Bro  Alive   • MGMo     • MGFa     • PGMo     • PGFa     • Bro  Alive   • Son  Alive   • Damaris  Alive   • Damaris  Alive     Family History   Problem Relation Age of Onset   • No Known Problems Mother    • Lung Disease Father         COPD (smoker)   • Hyperlipidemia Father    • Other Brother         Gout   • Hyperlipidemia Brother    •  Hypertension Brother    • Cancer Paternal Grandmother    • No Known Problems Son    • No Known Problems Daughter    • No Known Problems Daughter        ROS:  Review of Systems   Constitutional: Negative for fever, chills, weight loss and malaise/fatigue.  Positive for daytime somnolence.  HENT: Negative for ear pain, nosebleeds, congestion, sore throat and neck pain.    Eyes: Negative for blurred vision.   Respiratory: Negative for cough, sputum production, shortness of breath.  Positive for wheezing.  Cardiovascular: Negative for chest pain, palpitations, orthopnea and leg swelling.   Gastrointestinal: Negative for heartburn, nausea, vomiting and abdominal pain.   Genitourinary: Negative for dysuria, urgency and frequency.   Musculoskeletal: Negative for myalgias, back pain and joint pain.   Skin: Negative for rash and itching.   Neurological: Negative for dizziness, tingling, tremors, sensory change, focal weakness and headaches.   Endo/Heme/Allergies: Does not bruise/bleed easily.   Psychiatric/Behavioral: Negative for depression, suicidal ideas and memory loss.  The patient is not nervous/anxious and does not have insomnia.    All other systems reviewed and are negative except as in HPI.    Exam: /82 (BP Location: Left arm, Patient Position: Sitting)   Pulse 100   Temp 36.3 °C (97.3 °F) (Temporal)   Resp 20   Ht 1.829 m (6')   Wt 96.2 kg (212 lb)   SpO2 93%  Body mass index is 28.75 kg/m².  General: Normal appearing. No distress.  HEENT: Normocephalic. Eyes conjunctiva clear lids without ptosis, ears normal shape and contour.  Neck: Supple without JVD. Thyroid is not enlarged.  Pulmonary: Clear to ausculation.  Normal effort. No rales, ronchi.  Positive for diffuse wheezing.  Cardiovascular: Regular rate and rhythm without murmur.   Abdomen: Soft, nontender, nondistended. Normal bowel sounds. Liver and spleen are not palpable  Neurologic: Grossly nonfocal.  Cranial nerves are normal.   Skin: Warm and  dry.  No rashes or suspicious skin lesions.  Musculoskeletal: Normal gait. No extremity cyanosis, clubbing, or edema.  Psych: Normal mood and affect. Alert and oriented x3. Judgment and insight is normal.    Medical decision-making and discussion:  1. Moderate persistent asthma without complication  PFTs have been ordered to further evaluate patient.  Patient be contacted with results.  Patient has been prescribed Qvar.  Discussed common side effects and adverse reactions of medication with patient.  Advised patient to keep inhalers with him at all times.  If he ever develops shortness of breath not alleviated with medications to seek me emergency care.  Continue to monitor.    - PULMONARY FUNCTION TESTS -Test requested: Complete Pulmonary Function Test; Future  - beclomethasone HFA (QVAR REDIHALER) 80 MCG/ACT inhaler; Inhale 1 Puff by mouth 2 times a day.  Dispense: 1 Inhaler; Refill: 6  - montelukast (SINGULAIR) 10 MG Tab; Take 1 Tab by mouth every day.  Dispense: 30 Tab; Refill: 12    2. Obstructive sleep apnea on CPAP  Chronic but worsening problem.  Patient has been referred to sleep studies for reevaluation.  Continue CPAP as prescribed.  Continue normalizing body weight.  Continue practicing good sleep hygiene.  Continue to monitor.    - REFERRAL TO SLEEP STUDIES    3. TAMICA (generalized anxiety disorder)  Patient is feeling well on current medications. Will continue. Denies any suicidal or homicidal ideation. Emphasized importance of healthy diet and exercise. Discussed that should the patient have any symptoms they should call suicide prevention hotline or report to the emergency room immediately.    - DULoxetine (CYMBALTA) 60 MG Cap DR Particles delayed-release capsule; Take 1 Cap by mouth every day.  Dispense: 90 Cap; Refill: 3    4. Gouty arthritis  Chronic but stable problem.  Avoid known triggers.  Continue work on diet and exercise.  Avoid diet high in purine's.  Continue allopurinol.  Continue  monitor.  - allopurinol (ZYLOPRIM) 100 MG Tab; Take 1 Tab by mouth every day.  Dispense: 90 Tab; Refill: 3    5. Essential hypertension  Well-controlled. Labs as indicated. Continue antihypertensive medications. Discussed decreasing salt intake. Emphasized benefits of exercise and diet. Continue to monitor.      6. Gastroesophageal reflux disease without esophagitis  This is a chronic and stable problem. Patient is doing well. No red flags. Continue PPI and monitor.      7. Mixed hyperlipidemia  Chronic problem.  Patient is past due for lab work.  Does not want to repeat lab work at this time.  States he would like to complete lab work next year.  Discussed 10-year ASCVD and CVD risk worse with patient.  Patient does not want to be placed on that medication.  Continue working on diet and exercise.    8. Need for vaccination  Vaccinations were administered patient without complication.  VIS forms were provided to patient.  - Influenza Vaccine Quad Injection (PF)  - Pneumococal Polysaccharide Vaccine 23-Valent =>3YO SQ/IM      Please note that this dictation was created using voice recognition software. I have made every reasonable attempt to correct obvious errors, but I expect that there are errors of grammar and possibly content that I did not discover before finalizing the note.    Assessment/Plan:  1. Moderate persistent asthma without complication  PULMONARY FUNCTION TESTS -Test requested: Complete Pulmonary Function Test    beclomethasone HFA (QVAR REDIHALER) 80 MCG/ACT inhaler   2. Obstructive sleep apnea on CPAP  REFERRAL TO SLEEP STUDIES   3. TAMICA (generalized anxiety disorder)  DULoxetine (CYMBALTA) 60 MG Cap DR Particles delayed-release capsule   4. Gouty arthritis  allopurinol (ZYLOPRIM) 100 MG Tab   5. Essential hypertension     6. Gastroesophageal reflux disease without esophagitis     7. Mixed hyperlipidemia     8. Need for vaccination  Influenza Vaccine Quad Injection (PF)    Pneumococal Polysaccharide  Vaccine 23-Valent =>1YO SQ/IM       No follow-ups on file.

## 2019-12-16 ENCOUNTER — HOSPITAL ENCOUNTER (OUTPATIENT)
Dept: PULMONOLOGY | Facility: MEDICAL CENTER | Age: 59
End: 2019-12-16
Attending: PHYSICIAN ASSISTANT
Payer: COMMERCIAL

## 2019-12-16 DIAGNOSIS — J45.40 MODERATE PERSISTENT ASTHMA WITHOUT COMPLICATION: ICD-10-CM

## 2019-12-16 PROCEDURE — 94060 EVALUATION OF WHEEZING: CPT

## 2019-12-16 PROCEDURE — 94729 DIFFUSING CAPACITY: CPT | Mod: 26 | Performed by: INTERNAL MEDICINE

## 2019-12-16 PROCEDURE — 94726 PLETHYSMOGRAPHY LUNG VOLUMES: CPT

## 2019-12-16 PROCEDURE — 94726 PLETHYSMOGRAPHY LUNG VOLUMES: CPT | Mod: 26 | Performed by: INTERNAL MEDICINE

## 2019-12-16 PROCEDURE — 94729 DIFFUSING CAPACITY: CPT

## 2019-12-16 PROCEDURE — 94060 EVALUATION OF WHEEZING: CPT | Mod: 26 | Performed by: INTERNAL MEDICINE

## 2019-12-16 ASSESSMENT — PULMONARY FUNCTION TESTS
FVC_LLN: 4.18
FEV1/FVC: 63
FEV1/FVC: 63.06
FEV1: 2.22
FEV1/FVC_PERCENT_PREDICTED: 72
FEV1_LLN: 3.22
FEV1: 2.44
FVC_PERCENT_PREDICTED: 79
FEV1/FVC_PERCENT_PREDICTED: 81
FEV1_PREDICTED: 3.86
FEV1/FVC_PERCENT_PREDICTED: 82
FEV1/FVC_PERCENT_LLN: 64.54
FEV1_PERCENT_PREDICTED: 63
FEV1/FVC_PREDICTED: 77.29
FEV1_PERCENT_PREDICTED: 5
FEV1/FVC_PERCENT_LLN: 64.54
FVC: 3.96
FVC_PERCENT_PREDICTED: 77
FEV1/FVC_PERCENT_PREDICTED: 6
FEV1_PERCENT_CHANGE: -9
FVC_PREDICTED: 5.01
FEV1_LLN: 3.22
FEV1/FVC_PERCENT_PREDICTED: 77
FEV1_PERCENT_CHANGE: 2
FEV1/FVC: 55.98
FEV1/FVC: 56.06
FVC_LLN: 4.18
FEV1/FVC_PERCENT_CHANGE: -11
FEV1/FVC_PERCENT_CHANGE: -450
FVC: 3.87

## 2020-02-14 DIAGNOSIS — I10 ESSENTIAL HYPERTENSION: ICD-10-CM

## 2020-02-18 RX ORDER — LISINOPRIL 10 MG/1
TABLET ORAL
Qty: 90 TAB | Refills: 0 | Status: SHIPPED | OUTPATIENT
Start: 2020-02-18 | End: 2020-05-21

## 2020-02-26 NOTE — PROGRESS NOTES
CC: Evaluation of YOLI on CPAP.    HPI:  Mr. Raphael Murrieta is a 59-year-old  kindly referred by Brandi Ann PA-C for evaluation of YOLI on CPAP.    He was diagnosed about 15 years ago to have YOLI and is been using CPAP since then.  His machine does not have a chip.  He does not feel that he is getting the rest that he used to get at the present time despite using CPAP.    The patient reports improved symptoms using positive airway pressure.  Has experienced no significant problems with mask fit, mask leak, pressure tolerance, excessive airway dryness, nasal congestion, aerophagia, or chest pain.      The patient works 8 AM to 5 PM.  He goes to bed at 10 PM and gets up at 7 AM on weekdays and 10-11 AM on weekends.  He has a regular bed partner.  He estimates his sleep latency to be about 10 minutes.  He may watch TV just prior to turn out the lights and attempting to go to sleep.  He experiences 1-2 nocturnal awakenings and one episode of nocturia each night.    Symptoms include difficulty awakening and getting out of bed after sleeping, sleepiness during the day, and tiredness during the day.  He no longer snores.  He denies restless legs or any parasomnia symptoms.  His father and 2 brothers all use CPAP.    He may fall asleep accidentally when talking on the phone or reading a book.  His wife has noted light snoring and pauses in his breathing throughout the night for years.  His total [place] score is a normal 9 out of 24.    Significant comorbidities and modifying factors include moderate persistent asthma, gout, anxiety, hypertension, gastroesophageal reflux, dyslipidemia, up-to-date with influenza vaccination, never smoker, splenectomy and overweight.    Pulmonary function testing in December 2019 showed an FEV1 of 63% of predicted, and FEV1/FVC of 63%, and a residual volume of 153% of predicted consistent with a moderate obstructive defect.            Patient Active Problem List     Diagnosis Date Noted   • YOLI (obstructive sleep apnea) 02/28/2020   • History of splenectomy 10/25/2018   • History of gout 07/15/2018   • Hyperlipidemia 07/12/2018   • Alcohol use 06/23/2017   • Increased BMI 06/23/2017   • Gastroesophageal reflux disease without esophagitis 06/23/2017   • Essential hypertension 01/05/2017   • Moderate persistent asthma 01/05/2017       Past Medical History:   Diagnosis Date   • Allergy-induced asthma    • GERD (gastroesophageal reflux disease)    • Hyperlipidemia    • Sleep apnea    • Tonsillitis         Past Surgical History:   Procedure Laterality Date   • SPLENECTOMY  1985    softball injury/ spleen rupture.    • KNEE ARTHROSCOPY  5/2105    left knee   • ROTATOR CUFF REPAIR      left shoulder   • TONSILLECTOMY         Family History   Problem Relation Age of Onset   • Lung Disease Father         COPD (smoker)   • Hyperlipidemia Father    • Sleep Apnea Father    • Other Brother         Gout   • Hyperlipidemia Brother    • Hypertension Brother    • Sleep Apnea Brother    • Cancer Paternal Grandmother    • Sleep Apnea Brother    • No Known Problems Son    • No Known Problems Daughter    • No Known Problems Daughter        Social History     Socioeconomic History   • Marital status:      Spouse name: Not on file   • Number of children: Not on file   • Years of education: Not on file   • Highest education level: Not on file   Occupational History   • Not on file   Social Needs   • Financial resource strain: Not on file   • Food insecurity     Worry: Not on file     Inability: Not on file   • Transportation needs     Medical: Not on file     Non-medical: Not on file   Tobacco Use   • Smoking status: Never Smoker   • Smokeless tobacco: Current User     Types: Chew   Substance and Sexual Activity   • Alcohol use: Yes     Alcohol/week: 6.0 oz     Types: 12 Cans of beer per week     Comment: Drinks 2-3 times a week and 3-4 beers per time.   • Drug use: No   • Sexual activity: Yes  "    Partners: Female     Comment: . [employer].    Lifestyle   • Physical activity     Days per week: Not on file     Minutes per session: Not on file   • Stress: Not on file   Relationships   • Social connections     Talks on phone: Not on file     Gets together: Not on file     Attends [employer] service: Not on file     Active member of club or organization: Not on file     Attends meetings of clubs or organizations: Not on file     Relationship status: Not on file   • Intimate partner violence     Fear of current or ex partner: Not on file     Emotionally abused: Not on file     Physically abused: Not on file     Forced sexual activity: Not on file   Other Topics Concern   • Not on file   Social History Narrative   • Not on file       Current Outpatient Medications   Medication Sig Dispense Refill   • lisinopril (PRINIVIL) 10 MG Tab TAKE 1 TABLET BY MOUTH EVERY DAY 90 Tab 0   • DULoxetine (CYMBALTA) 60 MG Cap DR Particles delayed-release capsule Take 1 Cap by mouth every day. 90 Cap 3   • allopurinol (ZYLOPRIM) 100 MG Tab Take 1 Tab by mouth every day. 90 Tab 3   • beclomethasone HFA (QVAR REDIHALER) 80 MCG/ACT inhaler Inhale 1 Puff by mouth 2 times a day. 1 Inhaler 6   • montelukast (SINGULAIR) 10 MG Tab Take 1 Tab by mouth every day. 30 Tab 12   • albuterol 108 (90 Base) MCG/ACT Aero Soln inhalation aerosol Inhale 2 Puffs by mouth every 6 hours as needed for Shortness of Breath. 8.5 g 0   • omeprazole (PRILOSEC OTC) 20 MG tablet Take 20 mg by mouth every day.     • fluticasone (FLOVENT HFA) 220 MCG/ACT Aerosol Inhale 1 Puff by mouth 2 times a day. (Patient not taking: Reported on 2/28/2020) 1 Inhaler 0   • colchicine (COLCRYS) 0.6 MG Tab Take one tab every hour x 3 (Patient not taking: Reported on 2/28/2020) 3 Tab 6     No current facility-administered medications for this visit.     \"CURRENT RX\"    ALLERGIES: Patient has no known allergies.    ROS    Constitutional: Denies fever, chills, " sweats,  weight loss, fatigue.  Eyes: Denies vision loss, pain, drainage, double vision, glasses.  Ears/Nose/Mouth/Throat: Denies earache, difficulty hearing, rhinitis/nasal congestion, injury, recurrent sore throat, persistent hoarseness, decayed teeth/toothaches, positive for ringing or buzzing in the ears.  Cardiovascular: Denies chest pain, tightness, palpitations, swelling in legs/feet, fainting, difficulty breathing when lying down but gets better when sitting up.   Respiratory: Denies shortness of breath, cough, sputum, wheezing, painful breathing, coughing up blood.   Sleep: per HPI  Gastrointestinal: Denies  difficulty swallowing, nausea, abdominal pain, diarrhea, constipation, positive for heartburn 2 years ago  Genitourinary: Denies  blood in urine, discharge, frequent urination.   Musculoskeletal: Denies painful joints, sore muscles, back pain.   Integumentary: Denies rashes, lumps, color changes.   Neurological: Denies frequent headaches,weakness, dizziness.    PHYSICAL EXAM  Overweight not obese  /90 (BP Location: Right arm, Patient Position: Sitting, BP Cuff Size: Large adult)   Pulse 100   Resp 14   Ht 1.829 m (6')   Wt 96.6 kg (213 lb)   SpO2 95%   BMI 28.89 kg/m²   Appearance: Well-nourished, well-developed, no acute distress  Eyes:  PERRLA, EOMI  ENMT: without lesions, deformities;hearing grossly intact; tongue normal, posterior pharynx without erythema or exudate;   Modified Mallampati (AKA Toure) Score: 4  Neck: Supple, trachea midline, no masses  Respiratory effort:  No intercostal retractions or use of accessory muscles  Lung auscultation:  No wheezes rhonchi rubs or rales  Cardiac: No murmurs, rubs, or gallops; regular rhythm, normal rate; no edema  Abdomen:  No tenderness, no organomegaly.  Overweight not obese. Ventral hernia repair.  Musculoskeletal:  Grossly normal; gait and station normal; digits and nails normal  Skin:  No rashes, petechiae, cyanosis  Neurologic: without  focal signs; oriented to person, time, place, and purpose; judgement intact  Psychiatric:  No depression, anxiety, agitation        PROBLEMS:  1. YOLI (obstructive sleep apnea)    - DME Mask and Supplies  - Overnight Home Sleep Study; Future    2. Essential hypertension      3. Gastroesophageal reflux disease without esophagitis      4. Moderate persistent asthma without complication      5. History of splenectomy - he has received both Pneumovax and Prevnar 13 as well as the seasonal influenza vaccination and is up-to-date with Tdap but should receive a Haemophilus influenza B vaccine, meningococcal serotype ACWY and meningococcal serotype B vaccinations if he has not already.  He will review this with his primary care doctor.      6. History of gout      7. Up to date with influenza vaccination      8. Overweight        PLAN:     Have advised the patient to follow up with the appropriate healthcare practitioners for all other medical problems and issues.          Return for after sleep study.

## 2020-02-28 ENCOUNTER — SLEEP CENTER VISIT (OUTPATIENT)
Dept: SLEEP MEDICINE | Facility: MEDICAL CENTER | Age: 60
End: 2020-02-28
Payer: COMMERCIAL

## 2020-02-28 VITALS
BODY MASS INDEX: 28.85 KG/M2 | SYSTOLIC BLOOD PRESSURE: 136 MMHG | RESPIRATION RATE: 14 BRPM | OXYGEN SATURATION: 95 % | WEIGHT: 213 LBS | HEART RATE: 100 BPM | DIASTOLIC BLOOD PRESSURE: 90 MMHG | HEIGHT: 72 IN

## 2020-02-28 DIAGNOSIS — K21.9 GASTROESOPHAGEAL REFLUX DISEASE WITHOUT ESOPHAGITIS: ICD-10-CM

## 2020-02-28 DIAGNOSIS — Z87.39 HISTORY OF GOUT: ICD-10-CM

## 2020-02-28 DIAGNOSIS — G47.33 OSA (OBSTRUCTIVE SLEEP APNEA): ICD-10-CM

## 2020-02-28 DIAGNOSIS — I10 ESSENTIAL HYPERTENSION: ICD-10-CM

## 2020-02-28 DIAGNOSIS — J45.40 MODERATE PERSISTENT ASTHMA WITHOUT COMPLICATION: ICD-10-CM

## 2020-02-28 DIAGNOSIS — Z92.29 UP TO DATE WITH INFLUENZA VACCINATION: ICD-10-CM

## 2020-02-28 DIAGNOSIS — E66.3 OVERWEIGHT: ICD-10-CM

## 2020-02-28 DIAGNOSIS — Z90.81 HISTORY OF SPLENECTOMY: ICD-10-CM

## 2020-02-28 PROCEDURE — 99204 OFFICE O/P NEW MOD 45 MIN: CPT | Performed by: INTERNAL MEDICINE

## 2020-03-27 ENCOUNTER — HOME STUDY (OUTPATIENT)
Dept: SLEEP MEDICINE | Facility: MEDICAL CENTER | Age: 60
End: 2020-03-27
Attending: INTERNAL MEDICINE
Payer: COMMERCIAL

## 2020-03-27 DIAGNOSIS — G47.33 OSA (OBSTRUCTIVE SLEEP APNEA): ICD-10-CM

## 2020-03-27 PROCEDURE — 95806 SLEEP STUDY UNATT&RESP EFFT: CPT | Performed by: FAMILY MEDICINE

## 2020-03-30 NOTE — PROCEDURES
DIAGNOSTIC HOME SLEEP TEST (HST) REPORT       PATIENT ID:  NAME:  Geoffrey Murrieta  MRN:               [id number]  YOB: 1960  DATE OF STUDY: 3/27/2020      Impression:     This study shows evidence of:     1.Moderate obstructive sleep apnea with  Respiratory Event Index (CANDI) of 18.8 per hour. These findings are based on the recording time (flow evaluation time). It is not possible with this device to determine a traditional apnea+hypopnea index (AHI) for total sleep time since EEG channels are not available.     2.  O2 Sat. carlos was 83%, avg O2 was 88 % and baseline O2 at 95 %. O2 sat was below 89% for 221 min of the flow evaluation time. Avg HR 80 BPM.      TECHNICAL DESCRIPTION:  [facility] Device used was a type-III home study device. Home sleep study recording included: Airflow recording by nasal pressure transducer; Respiratory Effort by abdominal Respiratory Bands; O2 by finger oximetry. A position sensor and a snore channel was also used.    Scoring Criteria: A modification of the the AASM Manual for the Scoring of Sleep and Associated Events. Obstructive apnea was scored by cessation of airflow for at least 10 seconds with continuing respiratory effort.  Central apnea was scored by cessation of airflow for at least 10 seconds with no effort.  Hypopnea was scored by a 30% or more reduction in airflow for at least 10 seconds accompanied by an arterial oxygen desaturation of 3% or more.  (For Medicare patients, hypopneas were scored by a 30% or more reduction in airflow for at least 10 seconds accompanied by an arterial oxygen saturation of 4% or more, as required by their insurance, CMS.        General sleep summary: . Total recording time is 464 minutes and total flow evaluation time is 421 minutes. The patient spent 61 minutes in the supine position.    Respiratory events:    Apneas: 6 (Obstructive apnea index 0.7/hr, Central apnea index 0.1 /hr, mixed 0 /hour)  Hypopneas:  126    Recommendations:    1. CPAP titration study vs Auto CPAP trial .   2.   In general patients with sleep apnea are advised to avoid alcohol and sedatives and to not operate a motor vehicle while drowsy. In some cases alternative treatment options may prove effective in resolving sleep apnea in these options include upper airway surgery, the use of a dental orthotic or weight loss and positional therapy. Clinical correlation is required.         Jesi Rivera MD

## 2020-04-06 ENCOUNTER — TELEPHONE (OUTPATIENT)
Dept: PULMONOLOGY | Facility: HOSPICE | Age: 60
End: 2020-04-06

## 2020-04-06 NOTE — TELEPHONE ENCOUNTER
Patient states completed HST and would like to know if he will be receiving a Cpap, Apap, or bipap. Informed as scheduled results will be reviewed at time of visit and the provider will recommend options given if diagnosised with sleep apnea. Pt then asked if I was able to go over results. Informed the patient no the provider will review/

## 2020-04-07 DIAGNOSIS — J45.40 MODERATE PERSISTENT ASTHMA WITHOUT COMPLICATION: ICD-10-CM

## 2020-04-07 RX ORDER — ALBUTEROL SULFATE 90 UG/1
2 AEROSOL, METERED RESPIRATORY (INHALATION) EVERY 6 HOURS PRN
Qty: 8.5 G | Refills: 3 | Status: SHIPPED | OUTPATIENT
Start: 2020-04-07 | End: 2020-05-26 | Stop reason: SDUPTHER

## 2020-05-12 ENCOUNTER — TELEMEDICINE (OUTPATIENT)
Dept: SLEEP MEDICINE | Facility: MEDICAL CENTER | Age: 60
End: 2020-05-12
Payer: COMMERCIAL

## 2020-05-12 VITALS — WEIGHT: 227 LBS | HEIGHT: 72 IN | BODY MASS INDEX: 30.75 KG/M2

## 2020-05-12 DIAGNOSIS — G47.33 OSA (OBSTRUCTIVE SLEEP APNEA): ICD-10-CM

## 2020-05-12 PROCEDURE — 99213 OFFICE O/P EST LOW 20 MIN: CPT | Mod: 95,CR | Performed by: FAMILY MEDICINE

## 2020-05-12 NOTE — PROGRESS NOTES
Telemedicine Visit: Established Patient     This encounter was conducted via Zoom .   Verbal consent was obtained. Patient's identity was verified.       [facility] Follow Up Note     Date: 5/12/2020 / Time: 10:00 AM    Patient ID:   Name:             Geoffrey Murrieta   YOB: 1960  Age:                 59 y.o.  male   MRN:               [id number]      Thank you for requesting a sleep medicine consultation on Geoffrey Murrieta at the sleep center. He presents today with the chief complaints of YOLI follow up.     HISTORY OF PRESENT ILLNESS:       Pt is currently on CPAP for last 15 years. He goes to sleep around 10 pm and wakes up around 7 am. He is getting about 8 hrs of sleep on a good night. Denies issues with falling and staying asleep. Overall, he doesnot finds his sleep refreshing even with his current CPAP machine, however denies snoring. He occasional take regular naps. He denies restless legs or any parasomnia symptoms.     Since his last visit he had a HST which showed  Moderate obstructive sleep apnea with  Respiratory Event Index (CANDI) of 18.8 per hour. O2 Sat. carlos was 83%, avg O2 was 88 % and baseline O2 at 95 %. O2 sat was below 89% for 221 min of the flow evaluation time. Avg HR 80 BPM.    Significant comorbidities and modifying factors include moderate persistent asthma, gout, anxiety, hypertension, gastroesophageal reflux, dyslipidemia, up-to-date with influenza vaccination, never smoker, splenectomy and overweight    REVIEW OF SYSTEMS:       Constitutional: Denies fevers, Denies weight changes  Eyes: Denies changes in vision, no eye pain  Ears/Nose/Throat/Mouth: Denies nasal congestion or sore throat   Cardiovascular: Denies chest pain or palpitations   Respiratory: Denies shortness of breath , Denies cough  Gastrointestinal/Hepatic: Denies abdominal pain, nausea,  Genitourinary: Deniesdysuria or frequency  Musculoskeletal/Rheum: Denies  joint pain and swelling    Skin/Breast: Denies rash,   Neurological: Denies headache, confusion,   Psychiatric: denies mood disorder   Sleep: Denies snoring, non restful     Comprehensive review of systems form is reviewed with the patient and is attached in the EMR.     PMH:  has a past medical history of Allergy-induced asthma, GERD (gastroesophageal reflux disease), Hyperlipidemia, Sleep apnea, and Tonsillitis.  MEDS:   Current Outpatient Medications:   •  albuterol 108 (90 Base) MCG/ACT Aero Soln inhalation aerosol, Inhale 2 Puffs by mouth every 6 hours as needed for Shortness of Breath., Disp: 8.5 g, Rfl: 3  •  lisinopril (PRINIVIL) 10 MG Tab, TAKE 1 TABLET BY MOUTH EVERY DAY, Disp: 90 Tab, Rfl: 0  •  DULoxetine (CYMBALTA) 60 MG Cap DR Particles delayed-release capsule, Take 1 Cap by mouth every day., Disp: 90 Cap, Rfl: 3  •  allopurinol (ZYLOPRIM) 100 MG Tab, Take 1 Tab by mouth every day., Disp: 90 Tab, Rfl: 3  •  montelukast (SINGULAIR) 10 MG Tab, Take 1 Tab by mouth every day., Disp: 30 Tab, Rfl: 12  •  omeprazole (PRILOSEC OTC) 20 MG tablet, Take 20 mg by mouth every day., Disp: , Rfl:   •  beclomethasone HFA (QVAR REDIHALER) 80 MCG/ACT inhaler, Inhale 1 Puff by mouth 2 times a day. (Patient not taking: Reported on 5/12/2020), Disp: 1 Inhaler, Rfl: 6  •  fluticasone (FLOVENT HFA) 220 MCG/ACT Aerosol, Inhale 1 Puff by mouth 2 times a day. (Patient not taking: Reported on 5/12/2020), Disp: 1 Inhaler, Rfl: 0  •  colchicine (COLCRYS) 0.6 MG Tab, Take one tab every hour x 3 (Patient not taking: Reported on 2/28/2020), Disp: 3 Tab, Rfl: 6  ALLERGIES: No Known Allergies  SURGHX:   Past Surgical History:   Procedure Laterality Date   • SPLENECTOMY  1985    softball injury/ spleen rupture.    • KNEE ARTHROSCOPY  5/2105    left knee   • ROTATOR CUFF REPAIR      left shoulder   • TONSILLECTOMY       SOCHX:  reports that he has never smoked. His smokeless tobacco use includes chew. He reports current alcohol use of about 6.0 oz of alcohol per  week. He reports that he does not use drugs..  FH:   Family History   Problem Relation Age of Onset   • Lung Disease Father         COPD (smoker)   • Hyperlipidemia Father    • Sleep Apnea Father    • Other Brother         Gout   • Hyperlipidemia Brother    • Hypertension Brother    • Sleep Apnea Brother    • Cancer Paternal Grandmother    • Sleep Apnea Brother    • No Known Problems Son    • No Known Problems Daughter    • No Known Problems Daughter          Physical Exam:  Vitals/ General Appearance:   Weight/BMI: Body mass index is 30.79 kg/m².  Ht 1.829 m (6')   Wt 103 kg (227 lb)   Vitals:    05/12/20 0955   Weight: 103 kg (227 lb)   Height: 1.829 m (6')       Pt. is alert and oriented to time, place and person. Cooperative and in no apparent distress.         Constitutional: Alert, no distress, well-groomed.  Skin: No rashes in visible areas.  Eye: Round. Conjunctiva clear, lids normal. No icterus.   ENMT: Lips pink without lesions, good dentition, moist mucous membranes. Phonation normal.  Neck: No masses, no thyromegaly. Moves freely without pain.  CV: Pulse as reported by patient  Respiratory: Unlabored respiratory effort, no cough or audible wheeze  Psych: Alert and oriented x3, normal affect and mood.   ASSESSMENT AND PLAN     1. Sleep Apnea      The pathophysiology of sleep anea and the increased risk of cardiovascular morbidity from untreated sleep apnea is discussed in detail with the patient.    He is urged to avoid supine sleep, weight gain and alcoholic beverages since all of these can worsen sleep apnea. He is cautioned against drowsy driving. If He feels sleepy while driving, He must pull over for a break/nap, rather than persist on the road, in the interest of He own safety and that of others on the road.      Plan   - Continue CPAP at the current pressure. He has set it to 15 cm.   - New ACPAP 10-20 cm is ordered today   - Consider OPO on f/u due to sever nocturnal hypoxia on his HST   - F/u in  60-90 daysweeks to assess the efficiacy of recommended pressure    - HST was reviewed and discussed with the pt   - compliance was reinforced     2. Regarding treatment of other past medical problems and general health maintenance,  He is urged to follow up with PCP.

## 2020-05-21 DIAGNOSIS — E78.2 MIXED HYPERLIPIDEMIA: ICD-10-CM

## 2020-05-21 DIAGNOSIS — I10 ESSENTIAL HYPERTENSION: ICD-10-CM

## 2020-05-26 ENCOUNTER — OFFICE VISIT (OUTPATIENT)
Dept: MEDICAL GROUP | Facility: PHYSICIAN GROUP | Age: 60
End: 2020-05-26
Payer: COMMERCIAL

## 2020-05-26 VITALS
OXYGEN SATURATION: 94 % | HEIGHT: 72 IN | WEIGHT: 224.6 LBS | TEMPERATURE: 98.7 F | RESPIRATION RATE: 20 BRPM | DIASTOLIC BLOOD PRESSURE: 78 MMHG | HEART RATE: 96 BPM | BODY MASS INDEX: 30.42 KG/M2 | SYSTOLIC BLOOD PRESSURE: 128 MMHG

## 2020-05-26 DIAGNOSIS — Z12.11 SCREENING FOR COLON CANCER: ICD-10-CM

## 2020-05-26 DIAGNOSIS — N52.9 ERECTILE DYSFUNCTION, UNSPECIFIED ERECTILE DYSFUNCTION TYPE: ICD-10-CM

## 2020-05-26 DIAGNOSIS — I10 ESSENTIAL HYPERTENSION: ICD-10-CM

## 2020-05-26 DIAGNOSIS — J45.40 MODERATE PERSISTENT ASTHMA WITHOUT COMPLICATION: ICD-10-CM

## 2020-05-26 DIAGNOSIS — F41.1 GAD (GENERALIZED ANXIETY DISORDER): ICD-10-CM

## 2020-05-26 DIAGNOSIS — D36.9 ADENOMATOUS POLYPS: ICD-10-CM

## 2020-05-26 PROCEDURE — 99214 OFFICE O/P EST MOD 30 MIN: CPT | Performed by: PHYSICIAN ASSISTANT

## 2020-05-26 RX ORDER — SILDENAFIL 100 MG/1
100 TABLET, FILM COATED ORAL PRN
Qty: 30 TAB | Refills: 3 | Status: SHIPPED | OUTPATIENT
Start: 2020-05-26 | End: 2021-04-01 | Stop reason: SDUPTHER

## 2020-05-26 RX ORDER — DULOXETIN HYDROCHLORIDE 30 MG/1
60 CAPSULE, DELAYED RELEASE ORAL DAILY
Qty: 180 CAP | Refills: 2 | Status: SHIPPED | OUTPATIENT
Start: 2020-05-26 | End: 2020-11-25

## 2020-05-26 RX ORDER — ALBUTEROL SULFATE 90 UG/1
2 AEROSOL, METERED RESPIRATORY (INHALATION) EVERY 6 HOURS PRN
Qty: 3 INHALER | Refills: 3 | Status: SHIPPED | OUTPATIENT
Start: 2020-05-26

## 2020-05-26 ASSESSMENT — PATIENT HEALTH QUESTIONNAIRE - PHQ9: CLINICAL INTERPRETATION OF PHQ2 SCORE: 0

## 2020-05-26 NOTE — PROGRESS NOTES
Chief Complaint   Patient presents with   • Hypertension     follow up    • Requesting Labs     discuss covid-19 labs        HISTORY OF PRESENT ILLNESS: Geoffrey Murrieta is an established 59 y.o. male here to discuss the evaluation and management of:    Moderate persistent asthma without complication  Chronic with stable problem.  Patient tells me he uses Qvar inhaler every morning but does not use it nightly.  States he uses albuterol inhaler as needed.  Denies side effects or complications from inhalers.  States he is feeling well.  Patient is requesting albuterol refill.    TAMICA (generalized anxiety disorder)  Chronic but stable problem.  He tells me anxiety is managed with Cymbalta 60 mg delayed release capsule once daily.  He does mention that he has been experiencing erectile dysfunction for the past 1 year.  Patient feels the medication could be causing erectile dysfunction.  He tells me he discontinued medication 2 days ago cold turkey to see if erectile dysfunction symptoms improved.  States he is unsure if symptoms have improved.  Patient is inquiring about Viagra to help with erectile dysfunction symptoms.  He tells me in the past he is tried Celexa and experienced unpleasant side effects.  States Wellbutrin made him more anxious.  Patient would like to continue Cymbalta as a treatment option.  Patient denies history of heart disease.       Erectile dysfunction, unspecified erectile dysfunction type  See above.    Essential hypertension  Chronic stable problem.  Patient's blood pressure denies appointment 128/78 mmHg.  He tells me he has not been as active as he would like since COVID-19 pandemic.  He mentions that he has gained some weight.  He tells me that he takes lisinopril as prescribed.  Denies side effects or complications medication.  Denies dry cough.  Denies chest pain, shortness breath, heart palpitations, dizziness, syncope, severe headache, vision changes, peripheral edema.    Adenomatous  polyps  Patient has a history of adenomatous polyps.  Last colonoscopy was 3 years ago.  He tells me that he is due for repeat colonoscopy.  States he is asymptomatic.  Denies fever, chills, nausea, vomiting, unintentional weight loss, melena, hematochezia, abdominal pain, changes in caliber of stool.      Patient Active Problem List    Diagnosis Date Noted   • YOLI (obstructive sleep apnea) 02/28/2020   • History of splenectomy 10/25/2018   • History of gout 07/15/2018   • Hyperlipidemia 07/12/2018   • Alcohol use 06/23/2017   • Increased BMI 06/23/2017   • Gastroesophageal reflux disease without esophagitis 06/23/2017   • Essential hypertension 01/05/2017   • Moderate persistent asthma 01/05/2017       Allergies:Patient has no known allergies.    Current Outpatient Medications   Medication Sig Dispense Refill   • albuterol 108 (90 Base) MCG/ACT Aero Soln inhalation aerosol Inhale 2 Puffs by mouth every 6 hours as needed for Shortness of Breath. 3 Inhaler 3   • DULoxetine (CYMBALTA) 30 MG Cap DR Particles Take 2 Caps by mouth every day. 180 Cap 2   • sildenafil citrate (VIAGRA) 100 MG tablet Take 1 Tab by mouth as needed for Erectile Dysfunction. 30 Tab 3   • lisinopril (PRINIVIL) 10 MG Tab TAKE 1 TABLET BY MOUTH EVERY DAY 90 Tab 0   • beclomethasone HFA (QVAR REDIHALER) 80 MCG/ACT inhaler Inhale 1 Puff by mouth 2 times a day. (Patient not taking: Reported on 5/12/2020) 1 Inhaler 6   • montelukast (SINGULAIR) 10 MG Tab Take 1 Tab by mouth every day. 30 Tab 12   • fluticasone (FLOVENT HFA) 220 MCG/ACT Aerosol Inhale 1 Puff by mouth 2 times a day. (Patient not taking: Reported on 5/12/2020) 1 Inhaler 0   • omeprazole (PRILOSEC OTC) 20 MG tablet Take 20 mg by mouth every day.       No current facility-administered medications for this visit.        Social History     Tobacco Use   • Smoking status: Never Smoker   • Smokeless tobacco: Former User     Types: Chew   Substance Use Topics   • Alcohol use: Yes      Alcohol/week: 6.0 oz     Types: 12 Cans of beer per week     Comment: Drinks 2-3 times a week and 3-4 beers per time.   • Drug use: No       Family Status   Relation Name Status   • Mo  Alive   • Fa     • Sis  Alive   • Bro  Alive   • MGMo     • MGFa     • PGMo     • PGFa     • Bro  Alive   • Son  Alive   • Damaris  Alive   • Damaris  Alive     Family History   Problem Relation Age of Onset   • Lung Disease Father         COPD (smoker)   • Hyperlipidemia Father    • Sleep Apnea Father    • Other Brother         Gout   • Hyperlipidemia Brother    • Hypertension Brother    • Sleep Apnea Brother    • Cancer Paternal Grandmother    • Sleep Apnea Brother    • No Known Problems Son    • No Known Problems Daughter    • No Known Problems Daughter        ROS:  Review of Systems   Constitutional: Negative for fever, chills, weight loss and malaise/fatigue.   HENT: Negative for ear pain, nosebleeds, congestion, sore throat and neck pain.    Eyes: Negative for blurred vision.   Respiratory: Negative for cough, sputum production, shortness of breath and wheezing.    Cardiovascular: Negative for chest pain, palpitations, orthopnea and leg swelling.   Gastrointestinal: Negative for heartburn, nausea, vomiting and abdominal pain.   Genitourinary: Negative for dysuria, urgency and frequency.   Musculoskeletal: Negative for myalgias, back pain and joint pain.   Skin: Negative for rash and itching.   Neurological: Negative for dizziness, tingling, tremors, sensory change, focal weakness and headaches.   Endo/Heme/Allergies: Does not bruise/bleed easily.   Psychiatric/Behavioral: Negative for depression, suicidal ideas and memory loss.  The patient is not nervous/anxious and does not have insomnia.    All other systems reviewed and are negative except as in HPI.    Exam: /78 (BP Location: Left arm, Patient Position: Sitting)   Pulse 96   Temp 37.1 °C (98.7 °F) (Temporal)   Resp 20   Ht 1.829 m  (6')   Wt 101.9 kg (224 lb 9.6 oz)   SpO2 94%  Body mass index is 30.46 kg/m².  General: Normal appearing. No distress.  HEENT: Normocephalic. Eyes conjunctiva clear lids without ptosis, ears normal shape and contour.  Neck: Supple without JVD. Thyroid is not enlarged.  Pulmonary: Clear to ausculation.  Normal effort. No rales, ronchi, or wheezing.  Cardiovascular: Regular rate and rhythm without murmur.   Abdomen:Nondistended.   Neurologic: Grossly nonfocal.  Cranial nerves are normal.  Skin: Warm and dry.  No rashes or suspicious skin lesions.  Musculoskeletal: Normal gait. No extremity cyanosis, clubbing, or edema.  Psych: Normal mood and affect. Alert and oriented x3. Judgment and insight is normal.    Medical decision-making and discussion:  1. Moderate persistent asthma without complication  Chronic stable problem.  Continues inhalers as prescribed.  Discussed ED precautions with patient.  Vies patient keep inhalers with him at all times.    - albuterol 108 (90 Base) MCG/ACT Aero Soln inhalation aerosol; Inhale 2 Puffs by mouth every 6 hours as needed for Shortness of Breath.  Dispense: 3 Inhaler; Refill: 3    2. TAMICA (generalized anxiety disorder)  Represcribed Cymbalta.  Advised patient to take 30 mg delayed release capsule once daily for 2-3 weeks.  Advised patient if anxiety symptoms are not managed on 30 mg once daily to increase to twice daily.  Discussed with patient if he does increase medication to twice daily and does not like taking it twice a day he can take 2 tablets by mouth once daily.  Patient will reach out to me if he increases dosage to 60 mg once daily.  Advised patient continue work on diet, exercise, sleep hygiene, hydration, and developing healthy coping mechanisms for stress anxiety.    Denies any suicidal or homicidal ideation. Discussed that should the patient have any symptoms they should call suicide prevention hotline or report to the emergency room immediately.    - DULoxetine  (CYMBALTA) 30 MG Cap DR Particles; Take 2 Caps by mouth every day.  Dispense: 180 Cap; Refill: 2    3. Erectile dysfunction, unspecified erectile dysfunction type  Lab work has been ordered.  Patient be contacted with results.  If results warrant a follow-up appointment, follow-up appointment was made.  Viagra 100 mg tab has been prescribed.  Advised patient take half a tablet by mouth 30 minutes-4 hours prior to sexual activity.  Advised patient is 50 mg does not improve erectile dysfunction symptoms he can increase to a full tablet.  Patient agreed to plan.  Do not exceed more than 100 mg in a 24-hour span.  Advised patient if he experiences erection lasting more than 4 hours to seek immediate emergency care.  Patient good plan.    - sildenafil citrate (VIAGRA) 100 MG tablet; Take 1 Tab by mouth as needed for Erectile Dysfunction.  Dispense: 30 Tab; Refill: 3  - TESTOSTERONE LC-MS/MX BIO/SHBG; Future    4. Essential hypertension  Well-controlled. Labs as indicated. Continue antihypertensive medications. Discussed decreasing salt intake. Emphasized benefits of exercise and diet. Continue to monitor.    - CBC WITH DIFFERENTIAL; Future    5. Adenomatous polyps    - REFERRAL TO GI FOR COLONOSCOPY    6. Screening for colon cancer    - REFERRAL TO GI FOR COLONOSCOPY    Please note that this dictation was created using voice recognition software. I have made every reasonable attempt to correct obvious errors, but I expect that there are errors of grammar and possibly content that I did not discover before finalizing the note.    Assessment/Plan:  1. Moderate persistent asthma without complication  albuterol 108 (90 Base) MCG/ACT Aero Soln inhalation aerosol   2. TAMICA (generalized anxiety disorder)  DULoxetine (CYMBALTA) 30 MG Cap DR Particles   3. Erectile dysfunction, unspecified erectile dysfunction type  sildenafil citrate (VIAGRA) 100 MG tablet    TESTOSTERONE LC-MS/MX BIO/SHBG   4. Essential hypertension  CBC WITH  DIFFERENTIAL   5. Adenomatous polyps  REFERRAL TO GI FOR COLONOSCOPY   6. Screening for colon cancer  REFERRAL TO GI FOR COLONOSCOPY       Return in about 6 months (around 11/26/2020), or if symptoms worsen or fail to improve.

## 2020-08-11 ENCOUNTER — HOSPITAL ENCOUNTER (OUTPATIENT)
Dept: LAB | Facility: MEDICAL CENTER | Age: 60
End: 2020-08-11
Attending: PHYSICIAN ASSISTANT
Payer: COMMERCIAL

## 2020-08-11 DIAGNOSIS — I10 ESSENTIAL HYPERTENSION: ICD-10-CM

## 2020-08-11 DIAGNOSIS — E78.2 MIXED HYPERLIPIDEMIA: ICD-10-CM

## 2020-08-11 DIAGNOSIS — N52.9 ERECTILE DYSFUNCTION, UNSPECIFIED ERECTILE DYSFUNCTION TYPE: ICD-10-CM

## 2020-08-11 LAB
ALBUMIN SERPL BCP-MCNC: 4.7 G/DL (ref 3.2–4.9)
ALBUMIN/GLOB SERPL: 1.3 G/DL
ALP SERPL-CCNC: 79 U/L (ref 30–99)
ALT SERPL-CCNC: 35 U/L (ref 2–50)
ANION GAP SERPL CALC-SCNC: 11 MMOL/L (ref 7–16)
AST SERPL-CCNC: 29 U/L (ref 12–45)
BASOPHILS # BLD AUTO: 1.1 % (ref 0–1.8)
BASOPHILS # BLD: 0.1 K/UL (ref 0–0.12)
BILIRUB SERPL-MCNC: 0.7 MG/DL (ref 0.1–1.5)
BUN SERPL-MCNC: 18 MG/DL (ref 8–22)
CALCIUM SERPL-MCNC: 10.5 MG/DL (ref 8.5–10.5)
CHLORIDE SERPL-SCNC: 103 MMOL/L (ref 96–112)
CHOLEST SERPL-MCNC: 280 MG/DL (ref 100–199)
CO2 SERPL-SCNC: 28 MMOL/L (ref 20–33)
CREAT SERPL-MCNC: 1.27 MG/DL (ref 0.5–1.4)
EOSINOPHIL # BLD AUTO: 0.51 K/UL (ref 0–0.51)
EOSINOPHIL NFR BLD: 5.8 % (ref 0–6.9)
ERYTHROCYTE [DISTWIDTH] IN BLOOD BY AUTOMATED COUNT: 48.8 FL (ref 35.9–50)
FASTING STATUS PATIENT QL REPORTED: NORMAL
GLOBULIN SER CALC-MCNC: 3.6 G/DL (ref 1.9–3.5)
GLUCOSE SERPL-MCNC: 109 MG/DL (ref 65–99)
HCT VFR BLD AUTO: 53 % (ref 42–52)
HDLC SERPL-MCNC: 50 MG/DL
HGB BLD-MCNC: 17.2 G/DL (ref 14–18)
IMM GRANULOCYTES # BLD AUTO: 0.02 K/UL (ref 0–0.11)
IMM GRANULOCYTES NFR BLD AUTO: 0.2 % (ref 0–0.9)
LDLC SERPL CALC-MCNC: 207 MG/DL
LYMPHOCYTES # BLD AUTO: 1.92 K/UL (ref 1–4.8)
LYMPHOCYTES NFR BLD: 22 % (ref 22–41)
MCH RBC QN AUTO: 30.7 PG (ref 27–33)
MCHC RBC AUTO-ENTMCNC: 32.5 G/DL (ref 33.7–35.3)
MCV RBC AUTO: 94.6 FL (ref 81.4–97.8)
MONOCYTES # BLD AUTO: 0.93 K/UL (ref 0–0.85)
MONOCYTES NFR BLD AUTO: 10.6 % (ref 0–13.4)
NEUTROPHILS # BLD AUTO: 5.26 K/UL (ref 1.82–7.42)
NEUTROPHILS NFR BLD: 60.3 % (ref 44–72)
NRBC # BLD AUTO: 0 K/UL
NRBC BLD-RTO: 0 /100 WBC
PLATELET # BLD AUTO: 385 K/UL (ref 164–446)
PMV BLD AUTO: 10.3 FL (ref 9–12.9)
POTASSIUM SERPL-SCNC: 4.9 MMOL/L (ref 3.6–5.5)
PROT SERPL-MCNC: 8.3 G/DL (ref 6–8.2)
RBC # BLD AUTO: 5.6 M/UL (ref 4.7–6.1)
SODIUM SERPL-SCNC: 142 MMOL/L (ref 135–145)
TRIGL SERPL-MCNC: 113 MG/DL (ref 0–149)
WBC # BLD AUTO: 8.7 K/UL (ref 4.8–10.8)

## 2020-08-11 PROCEDURE — 85025 COMPLETE CBC W/AUTO DIFF WBC: CPT

## 2020-08-11 PROCEDURE — 80053 COMPREHEN METABOLIC PANEL: CPT

## 2020-08-11 PROCEDURE — 84402 ASSAY OF FREE TESTOSTERONE: CPT

## 2020-08-11 PROCEDURE — 84403 ASSAY OF TOTAL TESTOSTERONE: CPT

## 2020-08-11 PROCEDURE — 80061 LIPID PANEL: CPT

## 2020-08-11 PROCEDURE — 36415 COLL VENOUS BLD VENIPUNCTURE: CPT

## 2020-08-11 PROCEDURE — 84270 ASSAY OF SEX HORMONE GLOBUL: CPT

## 2020-08-14 LAB
SHBG SERPL-SCNC: 31 NMOL/L (ref 11–80)
TESTOST FREE SERPL-MCNC: 86.7 PG/ML (ref 47–244)
TESTOST SERPL-MCNC: 464 NG/DL (ref 300–890)
TESTOSTERONE.FREE+WB SERPL-MCNC: 257.3 NG/DL (ref 130–680)

## 2020-08-18 ENCOUNTER — SLEEP CENTER VISIT (OUTPATIENT)
Dept: SLEEP MEDICINE | Facility: MEDICAL CENTER | Age: 60
End: 2020-08-18
Payer: COMMERCIAL

## 2020-08-18 VITALS
RESPIRATION RATE: 14 BRPM | SYSTOLIC BLOOD PRESSURE: 120 MMHG | HEIGHT: 72 IN | WEIGHT: 236 LBS | DIASTOLIC BLOOD PRESSURE: 86 MMHG | OXYGEN SATURATION: 94 % | BODY MASS INDEX: 31.97 KG/M2 | HEART RATE: 87 BPM

## 2020-08-18 DIAGNOSIS — G47.33 OBSTRUCTIVE SLEEP APNEA SYNDROME: ICD-10-CM

## 2020-08-18 DIAGNOSIS — J45.20 MILD INTERMITTENT ASTHMA WITHOUT COMPLICATION: ICD-10-CM

## 2020-08-18 PROCEDURE — 99214 OFFICE O/P EST MOD 30 MIN: CPT | Performed by: INTERNAL MEDICINE

## 2020-08-18 ASSESSMENT — FIBROSIS 4 INDEX: FIB4 SCORE: 0.75

## 2020-08-19 NOTE — PROGRESS NOTES
CC: Sleep apnea hypopnea syndrome.    HPI:   Mr. Murrieta was initially evaluated here on February 28, 2020 and last seen on 8/12/2020.    He has a long history of obstructive sleep apnea hypopnea syndrome extending back more than 15 years.  He has a regular sleep schedule with sufficient nightly sleep time.  Home sleep test done on March 27, 2020 demonstrated a respiratory event index of 18.8 events per hour with a lowest arterial oxygen saturation of 83% on room air.    He is treated with auto titrating CPAP at 10 to 20 cm water pressure.  He is using machine each night, throughout the night.  He has not had any unusual problems with mask fit, leakage or airway dryness using an F10 full facemask.  He enjoys reasonable levels of daytime alertness and is not napping or falling asleep inappropriately.  He is gained about 30 pounds in the last 6 months.    The CPAP data recording information was reviewed with the patient.  It demonstrates use in each of the last 30 days with an average daily usage of over 10 hours.  The median pressure is 11.2 cm of water with a maximum of 16.9.  It is moderate and the estimated residual apnea hypopnea index of 0.9 events per hour.  We have also look at his cell phone application for the CPAP machine which suggests similar findings.    He does have a history of asthma.  He has had no recent increase in symptoms including shortness of breath, cough or wheezing.  He is now off montelukast and and Flovent.  He is using the albuterol inhaler rarely, usually before exercise, and only needs to refill on the inhaler about every 3 months.  A pulmonary function study on December 16, 2019 demonstrated an FEV1 to FVC ratio of 63% and an FEV1 of 2.44 L or 63% of the predicted value without bronchodilator response.        Patient Active Problem List    Diagnosis Date Noted   • YOLI (obstructive sleep apnea) 02/28/2020   • History of splenectomy 10/25/2018   • History of gout 07/15/2018   •  Hyperlipidemia 07/12/2018   • Alcohol use 06/23/2017   • Increased BMI 06/23/2017   • Gastroesophageal reflux disease without esophagitis 06/23/2017   • Essential hypertension 01/05/2017   • Moderate persistent asthma 01/05/2017       Past Medical History:   Diagnosis Date   • Allergy-induced asthma    • GERD (gastroesophageal reflux disease)    • Hyperlipidemia    • Sleep apnea    • Tonsillitis        Past Surgical History:   Procedure Laterality Date   • SPLENECTOMY  1985    softball injury/ spleen rupture.    • KNEE ARTHROSCOPY  5/2105    left knee   • ROTATOR CUFF REPAIR      left shoulder   • TONSILLECTOMY         Family History   Problem Relation Age of Onset   • Lung Disease Father         COPD (smoker)   • Hyperlipidemia Father    • Sleep Apnea Father    • Other Brother         Gout   • Hyperlipidemia Brother    • Hypertension Brother    • Sleep Apnea Brother    • Cancer Paternal Grandmother    • Sleep Apnea Brother    • No Known Problems Son    • No Known Problems Daughter    • No Known Problems Daughter        Social History     Socioeconomic History   • Marital status:      Spouse name: Not on file   • Number of children: Not on file   • Years of education: Not on file   • Highest education level: Not on file   Occupational History   • Not on file   Social Needs   • Financial resource strain: Not on file   • Food insecurity     Worry: Not on file     Inability: Not on file   • Transportation needs     Medical: Not on file     Non-medical: Not on file   Tobacco Use   • Smoking status: Never Smoker   • Smokeless tobacco: Former User     Types: Chew   Substance and Sexual Activity   • Alcohol use: Yes     Alcohol/week: 6.0 oz     Types: 12 Cans of beer per week     Comment: Drinks 2-3 times a week and 3-4 beers per time.   • Drug use: No   • Sexual activity: Yes     Partners: Female     Comment: . [employer].    Lifestyle   • Physical activity     Days per week: Not on file      "Minutes per session: Not on file   • Stress: Not on file   Relationships   • Social connections     Talks on phone: Not on file     Gets together: Not on file     Attends [employer] service: Not on file     Active member of club or organization: Not on file     Attends meetings of clubs or organizations: Not on file     Relationship status: Not on file   • Intimate partner violence     Fear of current or ex partner: Not on file     Emotionally abused: Not on file     Physically abused: Not on file     Forced sexual activity: Not on file   Other Topics Concern   • Not on file   Social History Narrative   • Not on file       Current Outpatient Medications   Medication Sig Dispense Refill   • albuterol 108 (90 Base) MCG/ACT Aero Soln inhalation aerosol Inhale 2 Puffs by mouth every 6 hours as needed for Shortness of Breath. 3 Inhaler 3   • DULoxetine (CYMBALTA) 30 MG Cap DR Particles Take 2 Caps by mouth every day. 180 Cap 2   • sildenafil citrate (VIAGRA) 100 MG tablet Take 1 Tab by mouth as needed for Erectile Dysfunction. 30 Tab 3   • montelukast (SINGULAIR) 10 MG Tab Take 1 Tab by mouth every day. 30 Tab 12   • omeprazole (PRILOSEC OTC) 20 MG tablet Take 20 mg by mouth every day.     • lisinopril (PRINIVIL) 10 MG Tab TAKE 1 TABLET BY MOUTH EVERY DAY 90 Tab 2   • beclomethasone HFA (QVAR REDIHALER) 80 MCG/ACT inhaler Inhale 1 Puff by mouth 2 times a day. (Patient not taking: Reported on 5/12/2020) 1 Inhaler 6   • fluticasone (FLOVENT HFA) 220 MCG/ACT Aerosol Inhale 1 Puff by mouth 2 times a day. (Patient not taking: Reported on 5/12/2020) 1 Inhaler 0     No current facility-administered medications for this visit.     \"CURRENT RX\"      Allergies: Patient has no known allergies.      ROS  Unchanged from prior.      Physical Exam:   /86 (BP Location: Right arm, Patient Position: Sitting, BP Cuff Size: Adult)   Pulse 87   Resp 14   Ht 1.829 m (6')   Wt 107 kg (236 lb)   SpO2 94%   BMI 32.01 kg/m²    Limited " by coronavirus precautions.  He is a well-developed, well-nourished man who is alert, oriented and appropriately responsive.  He is not coughing and is not dyspneic.  He is wearing a mask.      Problems:  1. Obstructive sleep apnea syndrome  He has a long history of obstructive sleep apnea hypopnea syndrome, confirmed by a recent updated home sleep test.  He is doing well on auto titrating CPAP.  He is using the machine each night and for sufficient number of hours nightly, as confirmed by the data recorder.  He enjoys reasonable levels of daytime alertness and has a low residual apnea hypopnea index of 0.9 events per hour.  He is clearly benefiting from therapy.    2.  Mild intermittent asthma.  He is currently asymptomatic without complicating factors such as infection.  He is using albuterol inhaler rarely.  Current on vaccination.    3.  BMI = 32  We have discussed the role of weight management in the treatment of sleep disordered breathing.      Plan:   1.  Continue auto titrating CPAP at 10 to 20 cm water pressure.  A prescription is written for new mask and supplies as needed.    2.  He will continue the as needed use of the albuterol inhaler.  If he is using it once a day or more on a regular basis he will call and may at that time need to restart the inhaled corticosteroid and montelukast.    3.  Return visit here in a year, or sooner if new problems develop.    We appreciate the opportunity to assist in his care.    Total time for the visit today was 25 minutes.  More than half of that time was devoted to counseling the patient concerning the mechanics of sleep disordered breathing, the benefits of treatment in  improving daytime sleepiness and reducing the risk of future cardiac and neurologic events and in discussing treatment options.    Return in about 1 year (around 8/18/2021).

## 2020-08-25 DIAGNOSIS — D64.9 ANEMIA, UNSPECIFIED TYPE: ICD-10-CM

## 2020-08-25 DIAGNOSIS — R73.01 ELEVATED FASTING GLUCOSE: ICD-10-CM

## 2020-08-25 DIAGNOSIS — R94.4 DECREASED GFR: ICD-10-CM

## 2020-08-25 DIAGNOSIS — R77.0 ABNORMAL ALBUMIN: ICD-10-CM

## 2021-03-15 DIAGNOSIS — Z23 NEED FOR VACCINATION: ICD-10-CM

## 2021-04-04 DIAGNOSIS — F41.1 GAD (GENERALIZED ANXIETY DISORDER): ICD-10-CM

## 2021-04-05 RX ORDER — DULOXETIN HYDROCHLORIDE 60 MG/1
CAPSULE, DELAYED RELEASE ORAL
Qty: 90 CAPSULE | Refills: 0 | Status: SHIPPED | OUTPATIENT
Start: 2021-04-05

## 2023-11-20 NOTE — PROCEDURES
PULMONARY FUNCTION TESTING    INTERPRETING PHYSICIAN:  Michel Singh III, MD.    RESULTS:  SPIROMETRY:  1.  FVC pre-bronchodilator 3.87 L, 77% predicted; post-bronchodilator 3.96 L,   79% predicted.  2.  FEV1 pre-bronchodilator 2.44 L, 63% predicted; post-bronchodilator 2.22 L,   57% of predicted.  3.  FEV1/FVC 63%.  4.  FEF 25-75 pre-bronchodilator 1.01 L, 31% predicted; post-bronchodilator   0.82 L, 25% predicted.    LUNG VOLUMES:  1.  Residual volume 3.57 L, 153% predicted.  2.  TLC 7.59 L, 102% predicted.  3.  RV/TLC 47%.    DIFFUSION CAPACITY:  1.  DLCO corrected for hemoglobin 154% predicted.  2.  DLCO/% predicted.    INTERPRETATION:  1.  There is a moderately severe obstructive ventilatory defect.  There is no   significant response to bronchodilators.  The measured MVV is reduced   proportionately to the degree of reduction in FEV1.  2.  Lung volumes are consistent with air trapping.  3.  There is no significant diffusion impairment.  4.  No prior PFTs for comparison.       ____________________________________     MD MICHELE Varela IIIG / NTS    DD:  12/16/2019 18:26:37  DT:  12/16/2019 19:55:12    D#:  [id number]  Job#:  [id number]  
Render In Strict Bullet Format?: No
Detail Level: Zone
Discontinue Regimen: prednisone 10 mg tablet \\nhydrocortisone 2.5 % topical cream QHS